# Patient Record
Sex: MALE | Race: WHITE | NOT HISPANIC OR LATINO | Employment: UNEMPLOYED | URBAN - METROPOLITAN AREA
[De-identification: names, ages, dates, MRNs, and addresses within clinical notes are randomized per-mention and may not be internally consistent; named-entity substitution may affect disease eponyms.]

---

## 2017-09-05 ENCOUNTER — TRANSFERRED RECORDS (OUTPATIENT)
Dept: HEALTH INFORMATION MANAGEMENT | Facility: CLINIC | Age: 42
End: 2017-09-05

## 2017-11-30 ENCOUNTER — TRANSFERRED RECORDS (OUTPATIENT)
Dept: HEALTH INFORMATION MANAGEMENT | Facility: CLINIC | Age: 42
End: 2017-11-30

## 2017-12-01 ENCOUNTER — MEDICAL CORRESPONDENCE (OUTPATIENT)
Dept: HEALTH INFORMATION MANAGEMENT | Facility: CLINIC | Age: 42
End: 2017-12-01

## 2018-01-13 ENCOUNTER — HOSPITAL ENCOUNTER (EMERGENCY)
Facility: CLINIC | Age: 43
Discharge: HOME | End: 2018-01-14
Attending: EMERGENCY MEDICINE | Admitting: EMERGENCY MEDICINE
Payer: COMMERCIAL

## 2018-01-13 DIAGNOSIS — F10.920 ALCOHOLIC INTOXICATION WITHOUT COMPLICATION (H): ICD-10-CM

## 2018-01-13 PROCEDURE — 99283 EMERGENCY DEPT VISIT LOW MDM: CPT

## 2018-01-13 ASSESSMENT — ENCOUNTER SYMPTOMS: AGITATION: 1

## 2018-01-13 NOTE — ED AVS SNAPSHOT
Emergency Department    [address] [facility] [address]    Phone:  [phone]    Fax:  [phone]                                       Zak Weeks   MRN: [id number]    Department:   Emergency Department   Date of Visit:  1/13/2018           Patient Information     Date Of Birth          1975        Your diagnoses for this visit were:     Alcoholic intoxication without complication (H)        You were seen by Trierweiler, Chad A, MD.      Follow-up Information     Follow up with  Emergency Department.    Specialty:  EMERGENCY MEDICINE    Why:  If symptoms worsen    Contact information:    [address] [facility] [id number] [phone]        Discharge Instructions         Alcohol Intoxication  Alcohol intoxication occurs when you drink alcohol faster than your liver can remove it from your system. The following facts are important to remember:    It can take 10 minutes or more to start to feel the effects of a drink, so you can easily get more intoxicated than you intended.    One drink may be more than 1 serving of alcohol. Depending on the drink, it can be 2 to 4 servings.    It takes about an hour for your body to metabolize (clear) 1 serving. If you have more than 1 drink, it can take a couple of hours or more.    Many things affect how drinks will affect you, including whether you ve eaten, how fast you drink, your size, how much you normally drink (or not), medicines you take, chronic diseases you have, and gender.  Signs and symptoms of alcohol poisoning  The following are signs and symptoms of alcohol poisoning:  Mild impairment    Reduced inhibitions    Slurred speech    Drowsiness    Decreased fine motor skills  Moderate impairment    Erratic behavior, aggression, depression    Impaired judgment    Confusion    Concentration difficulties    Coordination problems  Severe impairment    Vomiting    Seizures    Unconsciousness    Cold, clammy    Slow or  "irregular breathing    Hypothermia (low body temperature)    Coma  Health effects  Alcohol abuse causes health problems. Sometimes this can happen after only drinking a  little.\" There is no set number of drinks or amount of alcohol that defines too much. The more you drink at one time, and the more frequently you drink determine both the short-term and long-term health effects. It affects all parts of your body and your health, including your:    Brain. Alcohol is a central nervous system depressant. It can damage parts of the brain that affect your balance, memory, thinking, and emotions. It can cause memory loss, blackouts, depression, agitation, sleep cycle changes, and seizures. These changes may or may not be reversible.    Heart and vascular system. Alcohol affects multiple areas. It can damage heart muscle causing cardiomyopathy, which is a weakening and stretching of the heart muscle. This can lead to trouble breathing, an irregular heartbeat, atrial fibrillation, leg swelling, and heart failure. It makes the blood vessels stiffen causing hypertension (high blood pressure). All of these problems increase your risk of having heart attacks or strokes.    Liver. Alcohol causes fat to build up in the liver, affecting its normal function. This increases the risk for hepatitis, leading to abdominal pain, appetite loss, jaundice, bleeding problems, liver fibrosis, and cirrhosis. This in turn can affect your ability to fight off infections, and can cause diabetes. The liver changes prevent it from removing toxins in your blood that can cause encephalopathy. Signs of this are confusion, altered level of consciousness, personality changes, memory loss, seizures, coma, and death.    Pancreas. Alcohol can cause inflammation of the pancreas, or pancreatitis. This can cause pain in your abdomen, fever, and diabetes.    Immune system. Alcohol weakens your immune system in a number of ways. It suppresses your immune system " making it harder to fight off infections and colds. You will also have a higher risk of certain infections like pneumonia and tuberculosis.    Cancer risk. Alcohol raises your risk of cancer of the mouth, esophagus, pharynx, larynx, liver, and breast.    Sexual function. Alcohol abuse can also lead to sexual problems.  Alcohol use during pregnancy may cause permanent damage to the growing baby.  Home care  The following guidelines will help you care for yourself at home:    Don't drink any more alcohol.    Don't drive until all effects of the alcohol have worn off.    Don't operate machinery that can cause injuries.    Get lots of rest over the next few days. Drink plenty of water and other non-alcoholic liquids. Try to eat regular meals.    If you have been drinking heavily on a daily basis, you may go through alcohol withdrawal. The usual symptoms last 3 to 4 days and may include nervousness, shakiness, nausea, sweating, sleeplessness, and can even cause seizures and a serious withdrawal symptom called delirium tremens, or DTs. During this time, it is best that you stay with family or friends who can help and support you. You can also admit yourself to a residential detox program. If your symptoms are severe (seizures, severe shakiness, confusion), contact your doctor or call an ambulance for help (see below).   Follow-up care  If alcohol is a problem in your life, these are some organizations that can help you:    Alcoholics Anonymous offers support through a self-help fellowship. There are no dues or fees. See the Yellow Pages and call for time and place of meetings. Find AA online at www.aa.org.    [employer] offers support to families of alcohol users. Contact 786-964-4272, or online at www.al-rohini.org.    National [place] on Alcoholism and Drug Dependence can be reached at 363-844-4173, or online at www.ncadd.org.    There are also inpatient and residential alcohol detox programs. Check the Internet or phonebook  Yellow Pages under  Drug Abuse and Treatment Centers.   Call 911  Call 911 if any of these occur:    Trouble breathing or slow irregular breathing    Chest pain    Sudden weakness on one side of your body or sudden trouble speaking    Heavy bleeding or vomiting blood    Very drowsy or trouble awakening    Fainting or loss of consciousness    Rapid heart rate    Seizure  When to seek medical advice  Call your healthcare provider right away if any of these occur:    Severe shakiness     Fever over 100.4  F (38.0  C)    Confusion or hallucinations (seeing, hearing, or feeling things that are not there)    Pain in your upper abdomen that gets worse    Repeated vomiting  Date Last Reviewed: 6/1/2016 2000-2017 [employer]. [address] [place] [address], [place] [address]. All rights reserved. This information is not intended as a substitute for professional medical care. Always follow your healthcare professional's instructions.          24 Hour Appointment Hotline       To make an appointment at any [facility], call [phone] ([phone]). If you don't have a family doctor or clinic, we will help you find one. [facility] clinics are conveniently located to serve the needs of you and your family.             Review of your medicines      Our records show that you are taking the medicines listed below. If these are incorrect, please call your family doctor or clinic.        Dose / Directions Last dose taken    buPROPion 150 MG 12 hr tablet   Commonly known as:  WELLBUTRIN SR   Quantity:  60 tablet        Take 1 tablet a day for 5 days then increase to 1 tablet twice a day   Refills:  1        omeprazole 20 MG CR capsule   Commonly known as:  priLOSEC   Dose:  20 mg   Quantity:  30 capsule        Take 1 capsule (20 mg) by mouth daily   Refills:  5                Orders Needing Specimen Collection     None      Pending Results     No orders found for last 3 day(s).            Pending Culture Results      No orders found for last 3 day(s).            Pending Results Instructions     If you had any lab results that were not finalized at the time of your Discharge, you can call the ED Lab Result RN at [phone]. You will be contacted by this team for any positive Lab results or changes in treatment. The nurses are available 7 days a week from 10A to 6:30P.  You can leave a message 24 hours per day and they will return your call.        Test Results From Your Hospital Stay               Clinical Quality Measure: Blood Pressure Screening     Your blood pressure was checked while you were in the emergency department today. The last reading we obtained was  BP: 94/55 . Please read the guidelines below about what these numbers mean and what you should do about them.  If your systolic blood pressure (the top number) is less than 120 and your diastolic blood pressure (the bottom number) is less than 80, then your blood pressure is normal. There is nothing more that you need to do about it.  If your systolic blood pressure (the top number) is 120-139 or your diastolic blood pressure (the bottom number) is 80-89, your blood pressure may be higher than it should be. You should have your blood pressure rechecked within a year by a primary care provider.  If your systolic blood pressure (the top number) is 140 or greater or your diastolic blood pressure (the bottom number) is 90 or greater, you may have high blood pressure. High blood pressure is treatable, but if left untreated over time it can put you at risk for heart attack, stroke, or kidney failure. You should have your blood pressure rechecked by a primary care provider within the next 4 weeks.  If your provider in the emergency department today gave you specific instructions to follow-up with your doctor or provider even sooner than that, you should follow that instruction and not wait for up to 4 weeks for your follow-up visit.        Thank you for choosing [facility]   "     Thank you for choosing [facility] for your care. Our goal is always to provide you with excellent care. Hearing back from our patients is one way we can continue to improve our services. Please take a few minutes to complete the written survey that you may receive in the mail after you visit with us. Thank you!        [employer] Information     [employer] lets you send messages to your doctor, view your test results, renew your prescriptions, schedule appointments and more. To sign up, go to [email or web address] . Click on \"Log in\" on the left side of the screen, which will take you to the Welcome page. Then click on \"Sign up Now\" on the right side of the page.     You will be asked to enter the access code listed below, as well as some personal information. Please follow the directions to create your username and password.     Your access code is: [email or web address]  Expires: 2018  4:22 AM     Your access code will  in 90 days. If you need help or a new code, please call your [facility] clinic or [phone].        Care EveryWhere ID     This is your Care EveryWhere ID. This could be used by other organizations to access your [facility] medical records  [id number]        Equal Access to Services     SILVANO RODRIGUES : Sandee Cortez, wachristi ortez, qadickson kaalmasophia sanchez, lawrence ruiz. So [facility] [phone].    ATENCIÓN: Si habla español, tiene a beasley disposición servicios gratuitos de asistencia lingüística. Llame al [phone].    We comply with applicable federal civil rights laws and [place] laws. We do not discriminate on the basis of race, color, national origin, age, disability, sex, sexual orientation, or gender identity.            After Visit Summary       This is your record. Keep this with you and show to your community pharmacist(s) and doctor(s) at your next visit.                  "

## 2018-01-13 NOTE — ED AVS SNAPSHOT
Emergency Department    [address] [place] [address]    Phone:  [phone]    Fax:  [phone]                                       Zak Weeks   MRN: [id number]    Department:   Emergency Department   Date of Visit:  1/13/2018           After Visit Summary Signature Page     I have received my discharge instructions, and my questions have been answered. I have discussed any challenges I see with this plan with the nurse or doctor.    ..........................................................................................................................................  Patient/Patient Representative Signature      ..........................................................................................................................................  Patient Representative Print Name and Relationship to Patient    ..................................................               ................................................  Date                                            Time    ..........................................................................................................................................  Reviewed by Signature/Title    ...................................................              ..............................................  Date                                                            Time

## 2018-01-14 VITALS
OXYGEN SATURATION: 94 % | DIASTOLIC BLOOD PRESSURE: 96 MMHG | SYSTOLIC BLOOD PRESSURE: 130 MMHG | RESPIRATION RATE: 16 BRPM | TEMPERATURE: 97.4 F

## 2018-01-14 NOTE — ED PROVIDER NOTES
History     Chief Complaint:  Alcohol intoxication     HPI:   The history is provided by the EMS personnel. The history is limited by the condition of the patient.      Zak Weeks is a 42 year old male who presents via EMS with alcohol intoxication. Per EMS, the patient had been gone several hours from his group home at the [facility] and returned smelling of alcohol. He was not responsive to questions at the house prompting staff to activate EMS. Here in th ED his breathalyzer is 0.28, and he has continued to have a decreased level of consciousness. On evaluation he only responded with vulgar language.     Allergies:  Bee Venom     Medications:    Wellbutrin   Prilosec     Past Medical History:    ADHD  Depression   Methamphetamine use   Seizure   Schizoaffective disorder   GERD  Traumatic brain injury     Past Surgical History:    Surgical pathology   Clot removed form brain     Family History:    Cancer   Depression   Bipolar disorder   Substance abuse     Social History:  Presents with police    Tobacco use: Current every day smoker   Alcohol use: Once a week   PCP: Lucille Sapp    Marital Status:  Single     Review of Systems   Psychiatric/Behavioral: Positive for agitation.   ROS LIMITED SECONDARY TO ALTERED MENTAL STATUS    Physical Exam     Patient Vitals for the past 24 hrs:   BP Temp Temp src Resp SpO2   01/13/18 2320 99/69 - - - 94 %   01/13/18 2315 - - - - 98 %   01/13/18 2245 - - - - 96 %   01/13/18 2230 - - - - 93 %   01/13/18 2215 - - - - 95 %   01/13/18 2007 (!) 129/102 97.4  F (36.3  C) Tympanic 16 97 %        Physical Exam  Eye:  Pupils are equal, round, and reactive.  Extraocular movements intact.    ENT:  No rhinorrhea.  Moist mucus membranes.  Normal tongue and tonsil.    Cardiac:  Regular rate and rhythm.  No murmurs, gallops, or rubs.    Pulmonary:  Clear to auscultation bilaterally.  No wheezes, rales, or rhonchi.    Abdomen:  Positive bowel sounds.  Abdomen is soft and  non-distended, without focal tenderness.    Musculoskeletal:  Normal movement of all extremities without evidence for deficit.    Skin:  Warm and dry without rashes.    Neurologic:  Non-focal exam without asymmetric weakness or numbness.     Psychiatric:  Patient is markedly intoxicated though arouses to stimuli, verbalizing expletives.     Emergency Department Course     Emergency Department Course:  Past medical records, nursing notes, and vitals reviewed.  2229: I performed an exam of the patient and obtained history, as documented above.   0000:  Patient improving, but still unable to ambulate without assistance.    Impression & Plan      Medical Decision Making:  This 42-year-old man with a history of traumatic brain injury and polysubstance abuse presents to us by EMS for altered mental status.  The patient left his group home for a while today and was able to get his hands on alcohol.  He returned to the group home and a significantly intoxicated state and was unable to care for himself.  Therefore he was sent here for further assessment.  On my exam, the patient smells very strongly of alcohol and has an alcohol level of greater than 0.2.  He was observed during my shift over a period of 3 hours during which time he did sober appropriately.  However, at the end of my shift, he continues to be unable to ambulate or be discharged to his group home.  Therefore he will be signed out to the oncoming emergency physician for continued sober reevaluation with probable discharge in the morning.  From what my nurse has told me, the group home is willing to take him back when he is able to care for himself and he should be discharged at that point.  With a strong smell of alcohol and his clearance during my shift, I do not feel he requires a head CT, lab work, or other intervention at this juncture.    Diagnosis:    ICD-10-CM    1. Alcoholic intoxication without complication (H) F10.920        Disposition:  Boarding in  ER until sober enough to ambulate and return to group home    Scribe Disclosure:   I, Mark Barton, am serving as a scribe at 8:29 PM on 1/13/2018 to document services personally performed by Trierweiler, Chad A, MD based on my observations and the provider's statements to me.       Mark Barton  1/13/2018    EMERGENCY DEPARTMENT       Trierweiler, Chad A, MD  01/14/18 0039

## 2018-01-14 NOTE — DISCHARGE INSTRUCTIONS
"  Alcohol Intoxication  Alcohol intoxication occurs when you drink alcohol faster than your liver can remove it from your system. The following facts are important to remember:    It can take 10 minutes or more to start to feel the effects of a drink, so you can easily get more intoxicated than you intended.    One drink may be more than 1 serving of alcohol. Depending on the drink, it can be 2 to 4 servings.    It takes about an hour for your body to metabolize (clear) 1 serving. If you have more than 1 drink, it can take a couple of hours or more.    Many things affect how drinks will affect you, including whether you ve eaten, how fast you drink, your size, how much you normally drink (or not), medicines you take, chronic diseases you have, and gender.  Signs and symptoms of alcohol poisoning  The following are signs and symptoms of alcohol poisoning:  Mild impairment    Reduced inhibitions    Slurred speech    Drowsiness    Decreased fine motor skills  Moderate impairment    Erratic behavior, aggression, depression    Impaired judgment    Confusion    Concentration difficulties    Coordination problems  Severe impairment    Vomiting    Seizures    Unconsciousness    Cold, clammy    Slow or irregular breathing    Hypothermia (low body temperature)    Coma  Health effects  Alcohol abuse causes health problems. Sometimes this can happen after only drinking a  little.\" There is no set number of drinks or amount of alcohol that defines too much. The more you drink at one time, and the more frequently you drink determine both the short-term and long-term health effects. It affects all parts of your body and your health, including your:    Brain. Alcohol is a central nervous system depressant. It can damage parts of the brain that affect your balance, memory, thinking, and emotions. It can cause memory loss, blackouts, depression, agitation, sleep cycle changes, and seizures. These changes may or may not be " reversible.    Heart and vascular system. Alcohol affects multiple areas. It can damage heart muscle causing cardiomyopathy, which is a weakening and stretching of the heart muscle. This can lead to trouble breathing, an irregular heartbeat, atrial fibrillation, leg swelling, and heart failure. It makes the blood vessels stiffen causing hypertension (high blood pressure). All of these problems increase your risk of having heart attacks or strokes.    Liver. Alcohol causes fat to build up in the liver, affecting its normal function. This increases the risk for hepatitis, leading to abdominal pain, appetite loss, jaundice, bleeding problems, liver fibrosis, and cirrhosis. This in turn can affect your ability to fight off infections, and can cause diabetes. The liver changes prevent it from removing toxins in your blood that can cause encephalopathy. Signs of this are confusion, altered level of consciousness, personality changes, memory loss, seizures, coma, and death.    Pancreas. Alcohol can cause inflammation of the pancreas, or pancreatitis. This can cause pain in your abdomen, fever, and diabetes.    Immune system. Alcohol weakens your immune system in a number of ways. It suppresses your immune system making it harder to fight off infections and colds. You will also have a higher risk of certain infections like pneumonia and tuberculosis.    Cancer risk. Alcohol raises your risk of cancer of the mouth, esophagus, pharynx, larynx, liver, and breast.    Sexual function. Alcohol abuse can also lead to sexual problems.  Alcohol use during pregnancy may cause permanent damage to the growing baby.  Home care  The following guidelines will help you care for yourself at home:    Don't drink any more alcohol.    Don't drive until all effects of the alcohol have worn off.    Don't operate machinery that can cause injuries.    Get lots of rest over the next few days. Drink plenty of water and other non-alcoholic liquids.  Try to eat regular meals.    If you have been drinking heavily on a daily basis, you may go through alcohol withdrawal. The usual symptoms last 3 to 4 days and may include nervousness, shakiness, nausea, sweating, sleeplessness, and can even cause seizures and a serious withdrawal symptom called delirium tremens, or DTs. During this time, it is best that you stay with family or friends who can help and support you. You can also admit yourself to a residential detox program. If your symptoms are severe (seizures, severe shakiness, confusion), contact your doctor or call an ambulance for help (see below).   Follow-up care  If alcohol is a problem in your life, these are some organizations that can help you:    Alcoholics Anonymous offers support through a self-help fellowship. There are no dues or fees. See the Yellow Pages and call for time and place of meetings. Find AA online at www.aa.org.    [employer] offers support to families of alcohol users. Contact 752-842-4382, or online at www.al-anoivan.org.    National [place] on Alcoholism and Drug Dependence can be reached at 710-660-8608, or online at www.ncadd.org.    There are also inpatient and residential alcohol detox programs. Check the Internet or phonebook Yellow Pages under  Drug Abuse and Treatment Centers.   Call 911  Call 911 if any of these occur:    Trouble breathing or slow irregular breathing    Chest pain    Sudden weakness on one side of your body or sudden trouble speaking    Heavy bleeding or vomiting blood    Very drowsy or trouble awakening    Fainting or loss of consciousness    Rapid heart rate    Seizure  When to seek medical advice  Call your healthcare provider right away if any of these occur:    Severe shakiness     Fever over 100.4  F (38.0  C)    Confusion or hallucinations (seeing, hearing, or feeling things that are not there)    Pain in your upper abdomen that gets worse    Repeated vomiting  Date Last Reviewed: 6/1/2016 2000-2017 [employer]. [address] [place] [address], [place] [address]. All rights reserved. This information is not intended as a substitute for professional medical care. Always follow your healthcare professional's instructions.

## 2018-02-23 ENCOUNTER — OFFICE VISIT (OUTPATIENT)
Dept: INTERNAL MEDICINE | Facility: CLINIC | Age: 43
End: 2018-02-23
Payer: COMMERCIAL

## 2018-02-23 VITALS
HEIGHT: 70 IN | TEMPERATURE: 98.2 F | WEIGHT: 212.7 LBS | RESPIRATION RATE: 24 BRPM | SYSTOLIC BLOOD PRESSURE: 106 MMHG | DIASTOLIC BLOOD PRESSURE: 72 MMHG | BODY MASS INDEX: 30.45 KG/M2 | HEART RATE: 82 BPM

## 2018-02-23 DIAGNOSIS — K21.9 GASTROESOPHAGEAL REFLUX DISEASE, ESOPHAGITIS PRESENCE NOT SPECIFIED: ICD-10-CM

## 2018-02-23 DIAGNOSIS — F25.9 SCHIZOAFFECTIVE DISORDER, CHRONIC CONDITION (H): Primary | ICD-10-CM

## 2018-02-23 DIAGNOSIS — S06.9X9D TRAUMATIC BRAIN INJURY WITH LOSS OF CONSCIOUSNESS, SUBSEQUENT ENCOUNTER: ICD-10-CM

## 2018-02-23 DIAGNOSIS — F33.1 MODERATE RECURRENT MAJOR DEPRESSION (H): ICD-10-CM

## 2018-02-23 PROCEDURE — 99214 OFFICE O/P EST MOD 30 MIN: CPT | Performed by: INTERNAL MEDICINE

## 2018-02-23 RX ORDER — RISPERIDONE 2 MG/1
2 TABLET, ORALLY DISINTEGRATING ORAL 2 TIMES DAILY PRN
COMMUNITY
Start: 2018-01-02

## 2018-02-23 RX ORDER — HYDROXYZINE PAMOATE 50 MG/1
50 CAPSULE ORAL 3 TIMES DAILY PRN
COMMUNITY
Start: 2018-01-02 | End: 2019-05-28

## 2018-02-23 RX ORDER — BENZTROPINE MESYLATE 1 MG/1
1 TABLET ORAL 3 TIMES DAILY PRN
COMMUNITY
Start: 2018-02-09 | End: 2021-09-20

## 2018-02-23 RX ORDER — QUETIAPINE FUMARATE 50 MG/1
25 TABLET, FILM COATED ORAL DAILY
COMMUNITY
Start: 2018-01-02 | End: 2021-09-20

## 2018-02-23 RX ORDER — CLONAZEPAM 0.5 MG/1
0.5 TABLET ORAL 2 TIMES DAILY PRN
COMMUNITY
Start: 2018-03-05 | End: 2019-08-30

## 2018-02-23 RX ORDER — DIVALPROEX SODIUM 250 MG/1
750 TABLET, DELAYED RELEASE ORAL 3 TIMES DAILY
COMMUNITY
Start: 2018-01-02 | End: 2021-09-20

## 2018-02-23 ASSESSMENT — PAIN SCALES - GENERAL: PAINLEVEL: WORST PAIN (10)

## 2018-02-23 NOTE — MR AVS SNAPSHOT
After Visit Summary   2/23/2018    Zak Weeks    MRN: [id number]           Patient Information     Date Of Birth          1975        Visit Information        Provider Department      2/23/2018 10:00 AM Lloyd Calixto MD [facility]        Today's Diagnoses     Schizoaffective disorder, chronic condition (H)    -  1    Moderate recurrent major depression (H)        Traumatic brain injury with loss of consciousness, subsequent encounter        Gastroesophageal reflux disease, esophagitis presence not specified          Care Instructions    Continue all medications at the same doses.  Contact your usual pharmacy if you need refills.     Discussed any psychaitric medication changes with your psychiatrist.           Follow-ups after your visit        Additional Services     MENTAL HEALTH REFERRAL  - Adult; Psychiatry and Medication Management; Psychiatry; Other: Not Listed - Enter Referral Details in Scheduling Comments Below; Patient call to schedule       All scheduling is subject to the client's specific insurance plan & benefits, provider/location availability, and provider clinical specialities.  Please arrive 15 minutes early for your first appointment and bring your completed paperwork.    **needs full time psychiatrist to manage his issues (NOT the consultative/collaborative psychiatry referral).  He is moving to the [facility] area very soon and would like provider in [facility]    ** Schedule appointment through the group home staff at the [facility] at [phone]    Please be aware that coverage of these services is subject to the terms and limitations of your health insurance plan.  Call member services at your health plan with any benefit or coverage questions.                  Who to contact     If you have questions or need follow up information about today's clinic visit or your schedule please contact [facility]  "directly at [phone].  Normal or non-critical lab and imaging results will be communicated to you by APProtecthart, letter or phone within 4 business days after the clinic has received the results. If you do not hear from us within 7 days, please contact the clinic through APProtecthart or phone. If you have a critical or abnormal lab result, we will notify you by phone as soon as possible.  Submit refill requests through Pyrolia or call your pharmacy and they will forward the refill request to us. Please allow 3 business days for your refill to be completed.          Additional Information About Your Visit        APProtectharNotch Wearable Movement Capture Information     Pyrolia lets you send messages to your doctor, view your test results, renew your prescriptions, schedule appointments and more. To sign up, go to [email or web address] . Click on \"Log in\" on the left side of the screen, which will take you to the Welcome page. Then click on \"Sign up Now\" on the right side of the page.     You will be asked to enter the access code listed below, as well as some personal information. Please follow the directions to create your username and password.     Your access code is: [email or web address]  Expires: 2018  4:22 AM     Your access code will  in 90 days. If you need help or a new code, please call your [place] clinic or [phone].        Care EveryWhere ID     This is your Care EveryWhere ID. This could be used by other organizations to access your [place] medical records  [id number]        Your Vitals Were     Pulse Temperature Respirations Height BMI (Body Mass Index)       82 98.2  F (36.8  C) (Oral) 24 5' 9.5\" (1.765 m) 30.96 kg/m2        Blood Pressure from Last 3 Encounters:   18 106/72   18 (!) 130/96   14 98/64    Weight from Last 3 Encounters:   18 212 lb 11.2 oz (96.5 kg)   14 178 lb (80.7 kg)   14 177 lb 12.8 oz (80.6 kg)              We Performed the Following     MENTAL HEALTH REFERRAL  - Adult; " Psychiatry and Medication Management; Psychiatry; Other: Not Listed - Enter Referral Details in Scheduling Comments Below; Patient call to schedule        Primary Care Provider Office Phone # Fax #    LISET Watson [phone]       XXX RESIGNED  [facility] [address]        Equal Access to Services     SILVANO RODRIGUES : Hadii aad ku hadasho Soomaali, waaxda luqadaha, qaybta kaalmada adeegyada, waxay idiin hayaan ademorena khmilagroskatherin lacristophern . So [facility] [phone].    ATENCIÓN: Si habla español, tiene a beasley disposición servicios gratuitos de asistencia lingüística. Anastasia al [phone].    We comply with applicable federal civil rights laws and [place] laws. We do not discriminate on the basis of race, color, national origin, age, disability, sex, sexual orientation, or gender identity.            Thank you!     Thank you for choosing [facility]  for your care. Our goal is always to provide you with excellent care. Hearing back from our patients is one way we can continue to improve our services. Please take a few minutes to complete the written survey that you may receive in the mail after your visit with us. Thank you!             Your Updated Medication List - Protect others around you: Learn how to safely use, store and throw away your medicines at www.disposemymeds.org.          This list is accurate as of 2/23/18 11:59 PM.  Always use your most recent med list.                   Brand Name Dispense Instructions for use Diagnosis    benztropine 1 MG tablet    COGENTIN     1 mg 3 times daily as needed    Schizoaffective disorder, chronic condition (H), Moderate recurrent major depression (H)       buPROPion 150 MG 12 hr tablet    WELLBUTRIN SR    60 tablet    Take 1 tablet a day for 5 days then increase to 1 tablet twice a day    Tobacco abuse       clonazePAM 0.5 MG tablet   Start taking on:  3/5/2018    klonoPIN     0.5 mg 2 times daily as  needed    Schizoaffective disorder, chronic condition (H), Moderate recurrent major depression (H)       divalproex sodium delayed-release 250 MG DR tablet    DEPAKOTE     Take 750 mg by mouth 3 times daily    Schizoaffective disorder, chronic condition (H), Moderate recurrent major depression (H)       hydrOXYzine 50 MG capsule    VISTARIL     50 mg 3 times daily as needed    Schizoaffective disorder, chronic condition (H), Moderate recurrent major depression (H)       omeprazole 20 MG CR capsule    priLOSEC    30 capsule    Take 1 capsule (20 mg) by mouth daily    Esophageal reflux       paliperidone 234 MG/1.5ML Susp    INVEGA SUSTENNA     Inject 234 mg into the muscle every 28 days    Schizoaffective disorder, chronic condition (H), Moderate recurrent major depression (H)       QUEtiapine 50 MG tablet    SEROquel     Take 25 mg by mouth daily    Schizoaffective disorder, chronic condition (H), Moderate recurrent major depression (H)       risperiDONE 2 MG ODT tab    risperDAL M-TABS     Place 2 mg under the tongue 2 times daily as needed    Schizoaffective disorder, chronic condition (H), Moderate recurrent major depression (H)
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
## 2018-03-02 ENCOUNTER — TELEPHONE (OUTPATIENT)
Dept: INTERNAL MEDICINE | Facility: CLINIC | Age: 43
End: 2018-03-02

## 2018-03-05 NOTE — PATIENT INSTRUCTIONS
Continue all medications at the same doses.  Contact your usual pharmacy if you need refills.     Discussed any psychaitric medication changes with your psychiatrist.

## 2018-03-28 ENCOUNTER — OFFICE VISIT (OUTPATIENT)
Dept: INTERNAL MEDICINE | Facility: CLINIC | Age: 43
End: 2018-03-28
Payer: COMMERCIAL

## 2018-03-28 VITALS
SYSTOLIC BLOOD PRESSURE: 116 MMHG | TEMPERATURE: 98.3 F | OXYGEN SATURATION: 97 % | DIASTOLIC BLOOD PRESSURE: 82 MMHG | BODY MASS INDEX: 30.25 KG/M2 | RESPIRATION RATE: 16 BRPM | HEART RATE: 72 BPM | WEIGHT: 207.8 LBS

## 2018-03-28 DIAGNOSIS — Z00.00 NORMAL PHYSICAL EXAMINATION: Primary | ICD-10-CM

## 2018-03-28 PROCEDURE — 99396 PREV VISIT EST AGE 40-64: CPT | Performed by: PHYSICIAN ASSISTANT

## 2018-03-28 ASSESSMENT — PAIN SCALES - GENERAL: PAINLEVEL: WORST PAIN (10)

## 2018-03-28 NOTE — MR AVS SNAPSHOT
"              After Visit Summary   3/28/2018    Zak Weeks    MRN: [id number]           Patient Information     Date Of Birth          1975        Visit Information        Provider Department      3/28/2018 8:40 AM Radha Coelho PA-C [facility]        Today's Diagnoses     Normal physical examination    -  1       Follow-ups after your visit        Who to contact     If you have questions or need follow up information about today's clinic visit or your schedule please contact [facility] directly at [phone].  Normal or non-critical lab and imaging results will be communicated to you by [employer] Diagnosticshart, letter or phone within 4 business days after the clinic has received the results. If you do not hear from us within 7 days, please contact the clinic through [employer] Diagnosticshart or phone. If you have a critical or abnormal lab result, we will notify you by phone as soon as possible.  Submit refill requests through LitRes or call your pharmacy and they will forward the refill request to us. Please allow 3 business days for your refill to be completed.          Additional Information About Your Visit        MyChart Information     LitRes lets you send messages to your doctor, view your test results, renew your prescriptions, schedule appointments and more. To sign up, go to [email or web address] . Click on \"Log in\" on the left side of the screen, which will take you to the Welcome page. Then click on \"Sign up Now\" on the right side of the page.     You will be asked to enter the access code listed below, as well as some personal information. Please follow the directions to create your username and password.     Your access code is: [email or web address]  Expires: 2018  5:22 AM     Your access code will  in 90 days. If you need help or a new code, please call your [facility] or [phone].        Care EveryWhere ID     This is your Care EveryWhere ID. This could " be used by other organizations to access your [place] medical records  [id number]        Your Vitals Were     Pulse Temperature Respirations Pulse Oximetry BMI (Body Mass Index)       72 98.3  F (36.8  C) (Oral) 16 97% 30.25 kg/m2        Blood Pressure from Last 3 Encounters:   03/28/18 116/82   02/23/18 106/72   01/14/18 (!) 130/96    Weight from Last 3 Encounters:   03/28/18 207 lb 12.8 oz (94.3 kg)   02/23/18 212 lb 11.2 oz (96.5 kg)   09/30/14 178 lb (80.7 kg)              Today, you had the following     No orders found for display       Primary Care Provider Office Phone # Fax #    Lucille BossLISET landon [phone]       XXX RESIGNED  [facility] [id number]        Equal Access to Services     [facility]: Hadii aad ku hadasho Sonilsaali, waaxda luqadaha, qaybta kaalmada adeegyada, waxay idiin hayivelissen raquel rivera . So [facility] [phone].    ATENCIÓN: Si habla español, tiene a beasley disposición servicios gratuitos de asistencia lingüística. Llame al [phone].    We comply with applicable federal civil rights laws and [place] laws. We do not discriminate on the basis of race, color, national origin, age, disability, sex, sexual orientation, or gender identity.            Thank you!     Thank you for choosing [facility]  for your care. Our goal is always to provide you with excellent care. Hearing back from our patients is one way we can continue to improve our services. Please take a few minutes to complete the written survey that you may receive in the mail after your visit with us. Thank you!             Your Updated Medication List - Protect others around you: Learn how to safely use, store and throw away your medicines at www.disposemymeds.org.          This list is accurate as of 3/28/18 12:29 PM.  Always use your most recent med list.                   Brand Name Dispense Instructions for use Diagnosis    benztropine 1 MG  tablet    COGENTIN     1 mg 3 times daily as needed    Schizoaffective disorder, chronic condition (H), Moderate recurrent major depression (H)       buPROPion 150 MG 12 hr tablet    WELLBUTRIN SR    60 tablet    Take 1 tablet a day for 5 days then increase to 1 tablet twice a day    Tobacco abuse       clonazePAM 0.5 MG tablet    klonoPIN     0.5 mg 2 times daily as needed    Schizoaffective disorder, chronic condition (H), Moderate recurrent major depression (H)       divalproex sodium delayed-release 250 MG DR tablet    DEPAKOTE     Take 750 mg by mouth 3 times daily    Schizoaffective disorder, chronic condition (H), Moderate recurrent major depression (H)       hydrOXYzine 50 MG capsule    VISTARIL     50 mg 3 times daily as needed    Schizoaffective disorder, chronic condition (H), Moderate recurrent major depression (H)       omeprazole 20 MG CR capsule    priLOSEC    30 capsule    Take 1 capsule (20 mg) by mouth daily    Esophageal reflux       paliperidone 234 MG/1.5ML Susp    INVEGA SUSTENNA     Inject 234 mg into the muscle every 28 days    Schizoaffective disorder, chronic condition (H), Moderate recurrent major depression (H)       QUEtiapine 50 MG tablet    SEROquel     Take 25 mg by mouth daily    Schizoaffective disorder, chronic condition (H), Moderate recurrent major depression (H)       risperiDONE 2 MG ODT tab    risperDAL M-TABS     Place 2 mg under the tongue 2 times daily as needed    Schizoaffective disorder, chronic condition (H), Moderate recurrent major depression (H)

## 2018-03-28 NOTE — PROGRESS NOTES
SUBJECTIVE:   CC: Zak Weeks is an 43 year old male who presents for a physical exam for his group home.     - pt presents to clinic for a physical exam for group home facility   - he declines wanting annual physical   - he has no concerns otherwise   - follows with psychiatry  - medication mangement through psychology   - h/o of acid reflux well controlled on medication, no symptoms     Reviewed and updated as needed this visit by clinical staff  Tobacco  Allergies  Meds  Problems         Reviewed and updated as needed this visit by Provider  Meds  Problems          ROS:  C: NEGATIVE for fever, chills, change in weight  I: NEGATIVE for worrisome rashes, moles or lesions  E: NEGATIVE for vision changes or irritation  ENT: NEGATIVE for ear, mouth and throat problems  R: NEGATIVE for significant cough or SOB  CV: NEGATIVE for chest pain, palpitations or peripheral edema  GI: NEGATIVE for nausea, abdominal pain, or change in bowel habits  GI: POSITIVE for heartburn or reflux   male: negative for dysuria, hematuria, decreased urinary stream, erectile dysfunction, urethral discharge  M: NEGATIVE for significant arthralgias or myalgia  N: NEGATIVE for weakness, dizziness or paresthesias    OBJECTIVE:   /82  Pulse 72  Temp 98.3  F (36.8  C) (Oral)  Resp 16  Wt 207 lb 12.8 oz (94.3 kg)  SpO2 97%  BMI 30.25 kg/m2  EXAM:  GENERAL: healthy, alert and no distress  EYES: Eyes grossly normal to inspection, PERRL and conjunctivae and sclerae normal  HENT: ear canals and TM's normal, nose and mouth without ulcers or lesions  NECK: no adenopathy, no asymmetry, masses, or scars and thyroid normal to palpation  RESP: lungs clear to auscultation - no rales, rhonchi or wheezes  CV: regular rate and rhythm, normal S1 S2, no S3 or S4, no murmur, click or rub, no peripheral edema and peripheral pulses strong  ABDOMEN: soft, nontender, no hepatosplenomegaly, no masses and bowel sounds normal  MS: no gross  musculoskeletal defects noted, no edema  SKIN: no suspicious lesions or rashes  NEURO: Normal strength and tone, mentation intact and speech normal  PSYCH: alert and oriented, mood flat, and speech clear    ASSESSMENT/PLAN:     1. Normal physical examination  - normal exam  - reviewed health maintenance, pt declined tetanus vaccination   - paperwork for group home completed   - pt should have annual wellness exams, although he declined this today     Radha Coelho PA-C  [facility]
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
## 2018-10-23 ENCOUNTER — OFFICE VISIT (OUTPATIENT)
Dept: FAMILY MEDICINE | Facility: CLINIC | Age: 43
End: 2018-10-23
Payer: COMMERCIAL

## 2018-10-23 VITALS
HEIGHT: 69 IN | TEMPERATURE: 97.8 F | BODY MASS INDEX: 27.16 KG/M2 | WEIGHT: 183.4 LBS | HEART RATE: 71 BPM | OXYGEN SATURATION: 98 % | RESPIRATION RATE: 20 BRPM | DIASTOLIC BLOOD PRESSURE: 68 MMHG | SYSTOLIC BLOOD PRESSURE: 112 MMHG

## 2018-10-23 DIAGNOSIS — K21.9 GASTROESOPHAGEAL REFLUX DISEASE, ESOPHAGITIS PRESENCE NOT SPECIFIED: Primary | ICD-10-CM

## 2018-10-23 DIAGNOSIS — Z23 NEED FOR PROPHYLACTIC VACCINATION AND INOCULATION AGAINST INFLUENZA: ICD-10-CM

## 2018-10-23 DIAGNOSIS — Z11.3 SCREEN FOR STD (SEXUALLY TRANSMITTED DISEASE): ICD-10-CM

## 2018-10-23 DIAGNOSIS — E78.5 HYPERLIPIDEMIA: ICD-10-CM

## 2018-10-23 DIAGNOSIS — Z13.6 CARDIOVASCULAR SCREENING; LDL GOAL LESS THAN 160: ICD-10-CM

## 2018-10-23 DIAGNOSIS — S06.9X9D TRAUMATIC BRAIN INJURY WITH LOSS OF CONSCIOUSNESS, SUBSEQUENT ENCOUNTER: ICD-10-CM

## 2018-10-23 DIAGNOSIS — Z23 NEED FOR PROPHYLACTIC VACCINATION WITH TETANUS-DIPHTHERIA (TD): ICD-10-CM

## 2018-10-23 LAB
ANION GAP SERPL CALCULATED.3IONS-SCNC: 7 MMOL/L (ref 3–14)
BUN SERPL-MCNC: 12 MG/DL (ref 7–30)
CALCIUM SERPL-MCNC: 9.3 MG/DL (ref 8.5–10.1)
CHLORIDE SERPL-SCNC: 106 MMOL/L (ref 94–109)
CHOLEST SERPL-MCNC: 230 MG/DL
CO2 SERPL-SCNC: 26 MMOL/L (ref 20–32)
CREAT SERPL-MCNC: 1.08 MG/DL (ref 0.66–1.25)
GFR SERPL CREATININE-BSD FRML MDRD: 74 ML/MIN/1.7M2
GLUCOSE SERPL-MCNC: 88 MG/DL (ref 70–99)
HDLC SERPL-MCNC: 32 MG/DL
LDLC SERPL CALC-MCNC: 157 MG/DL
NONHDLC SERPL-MCNC: 198 MG/DL
POTASSIUM SERPL-SCNC: 4.1 MMOL/L (ref 3.4–5.3)
SODIUM SERPL-SCNC: 139 MMOL/L (ref 133–144)
TRIGL SERPL-MCNC: 203 MG/DL

## 2018-10-23 PROCEDURE — 80061 LIPID PANEL: CPT | Performed by: INTERNAL MEDICINE

## 2018-10-23 PROCEDURE — 86803 HEPATITIS C AB TEST: CPT | Performed by: INTERNAL MEDICINE

## 2018-10-23 PROCEDURE — 90686 IIV4 VACC NO PRSV 0.5 ML IM: CPT | Performed by: INTERNAL MEDICINE

## 2018-10-23 PROCEDURE — 90715 TDAP VACCINE 7 YRS/> IM: CPT | Performed by: INTERNAL MEDICINE

## 2018-10-23 PROCEDURE — 80048 BASIC METABOLIC PNL TOTAL CA: CPT | Performed by: INTERNAL MEDICINE

## 2018-10-23 PROCEDURE — 90472 IMMUNIZATION ADMIN EACH ADD: CPT | Performed by: INTERNAL MEDICINE

## 2018-10-23 PROCEDURE — 36415 COLL VENOUS BLD VENIPUNCTURE: CPT | Performed by: INTERNAL MEDICINE

## 2018-10-23 PROCEDURE — 99213 OFFICE O/P EST LOW 20 MIN: CPT | Mod: 25 | Performed by: INTERNAL MEDICINE

## 2018-10-23 PROCEDURE — 87389 HIV-1 AG W/HIV-1&-2 AB AG IA: CPT | Performed by: INTERNAL MEDICINE

## 2018-10-23 PROCEDURE — 90471 IMMUNIZATION ADMIN: CPT | Performed by: INTERNAL MEDICINE

## 2018-10-23 RX ORDER — HYDROXYZINE HCL 10 MG/5 ML
50 SOLUTION, ORAL ORAL 3 TIMES DAILY
COMMUNITY
End: 2021-09-20

## 2018-10-23 RX ORDER — FLUOXETINE 20 MG/5ML
20 SOLUTION ORAL DAILY
COMMUNITY
End: 2019-05-28

## 2018-10-23 ASSESSMENT — PAIN SCALES - GENERAL: PAINLEVEL: NO PAIN (0)

## 2018-10-23 NOTE — LETTER
My Depression Action Plan  Name: Zak Weeks   Date of Birth 1975  Date: 10/23/2018    My doctor: Lucille Sapp   My clinic: [address]  [place] [address]  [phone]          GREEN    ZONE   Good Control    What it looks like:     Things are going generally well. You have normal up s and down s. You may even feel depressed from time to time, but bad moods usually last less than a day.   What you need to do:  1. Continue to care for yourself (see self care plan)  2. Check your depression survival kit and update it as needed  3. Follow your physician s recommendations including any medication.  4. Do not stop taking medication unless you consult with your physician first.           YELLOW         ZONE Getting Worse    What it looks like:     Depression is starting to interfere with your life.     It may be hard to get out of bed; you may be starting to isolate yourself from others.    Symptoms of depression are starting to last most all day and this has happened for several days.     You may have suicidal thoughts but they are not constant.   What you need to do:     1. Call your care team, your response to treatment will improve if you keep your care team informed of your progress. Yellow periods are signs an adjustment may need to be made.     2. Continue your self-care, even if you have to fake it!    3. Talk to someone in your support network    4. Open up your depression survival kit           RED    ZONE Medical Alert - Get Help    What it looks like:     Depression is seriously interfering with your life.     You may experience these or other symptoms: You can t get out of bed most days, can t work or engage in other necessary activities, you have trouble taking care of basic hygiene, or basic responsibilities, thoughts of suicide or death that will not go away, self-injurious behavior.     What you need to do:  1. Call your care team and  request a same-day appointment. If they are not available (weekends or after hours) call your local crisis line, emergency room or 911.            Depression Self Care Plan / Survival Kit    Self-Care for Depression  Here s the deal. Your body and mind are really not as separate as most people think.  What you do and think affects how you feel and how you feel influences what you do and think. This means if you do things that people who feel good do, it will help you feel better.  Sometimes this is all it takes.  There is also a place for medication and therapy depending on how severe your depression is, so be sure to consult with your medical provider and/ or Behavioral Health Consultant if your symptoms are worsening or not improving.     In order to better manage my stress, I will:    Exercise  Get some form of exercise, every day. This will help reduce pain and release endorphins, the  feel good  chemicals in your brain. This is almost as good as taking antidepressants!  This is not the same as joining a gym and then never going! (they count on that by the way ) It can be as simple as just going for a walk or doing some gardening, anything that will get you moving.      Hygiene   Maintain good hygiene (Get out of bed in the morning, Make your bed, Brush your teeth, Take a shower, and Get dressed like you were going to work, even if you are unemployed).  If your clothes don't fit try to get ones that do.    Diet  I will strive to eat foods that are good for me, drink plenty of water, and avoid excessive sugar, caffeine, alcohol, and other mood-altering substances.  Some foods that are helpful in depression are: complex carbohydrates, B vitamins, flaxseed, fish or fish oil, fresh fruits and vegetables.    Psychotherapy  I agree to participate in Individual Therapy (if recommended).    Medication  If prescribed medications, I agree to take them.  Missing doses can result in serious side effects.  I understand that  drinking alcohol, or other illicit drug use, may cause potential side effects.  I will not stop my medication abruptly without first discussing it with my provider.    Staying Connected With Others  I will stay in touch with my friends, family members, and my primary care provider/team.    Use your imagination  Be creative.  We all have a creative side; it doesn t matter if it s oil painting, sand castles, or mud pies! This will also kick up the endorphins.    Witness Beauty  (AKA stop and smell the roses) Take a look outside, even in mid-winter. Notice colors, textures. Watch the squirrels and birds.     Service to others  Be of service to others.  There is always someone else in need.  By helping others we can  get out of ourselves  and remember the really important things.  This also provides opportunities for practicing all the other parts of the program.    Humor  Laugh and be silly!  Adjust your TV habits for less news and crime-drama and more comedy.    Control your stress  Try breathing deep, massage therapy, biofeedback, and meditation. Find time to relax each day.     My support system    Clinic Contact:  Phone number:    Contact 1:  Phone number:    Contact 2:  Phone number:    Yarsani/:  Phone number:    Therapist:  Phone number:    Local crisis center:    Phone number:    Other community support:  Phone number:

## 2018-10-23 NOTE — MR AVS SNAPSHOT
After Visit Summary   10/23/2018    Zak Weeks    MRN: [id number]           Patient Information     Date Of Birth          1975        Visit Information        Provider Department      10/23/2018 11:10 AM Hugo Gracia MD [facility]        Today's Diagnoses     Gastroesophageal reflux disease, esophagitis presence not specified    -  1    Need for prophylactic vaccination and inoculation against influenza        Need for prophylactic vaccination with tetanus-diphtheria (Td)        Screen for STD (sexually transmitted disease)        CARDIOVASCULAR SCREENING; LDL GOAL LESS THAN 160        Traumatic brain injury with loss of consciousness, subsequent encounter          Care Instructions    [facility]    If you have any questions regarding to your visit please contact your care team:     Team Pink:   Clinic Hours Telephone Number   Internal Medicine:  Dr. Joanna Jackson NP 7am-7pm  Monday - Thursday   7am-5pm  Fridays  [phone]  (Appointment scheduling available 24/7)   Urgent Care - [place] and [facility] - 11am-9pm Monday-Friday Saturday-Sunday- 9am-5pm   [place] - 5pm-9pm Monday-Friday Saturday-Sunday- 9am-5pm  [phone] - [place]  [phone] - [place]       What options do I have for a visit other than an office visit? We offer electronic visits (e-visits) and telephone visits, when medically appropriate.  Please check with your medical insurance to see if these types of visits are covered, as you will be responsible for any charges that are not paid by your insurance.      You can use [employer] (secure electronic communication) to access to your chart, send your primary care provider a message, or make an appointment. Ask a team member how to get started.     For a price quote for your services, please call our Consumer Price Line at [phone] or our Imaging Cost estimation line at [phone]  "(for imaging tests).            Follow-ups after your visit        Who to contact     If you have questions or need follow up information about today's clinic visit or your schedule please contact [facility] directly at [phone].  Normal or non-critical lab and imaging results will be communicated to you by MyChart, letter or phone within 4 business days after the clinic has received the results. If you do not hear from us within 7 days, please contact the clinic through MyChart or phone. If you have a critical or abnormal lab result, we will notify you by phone as soon as possible.  Submit refill requests through [employer] or call your pharmacy and they will forward the refill request to us. Please allow 3 business days for your refill to be completed.          Additional Information About Your Visit        Care EveryWhere ID     This is your Care EveryWhere ID. This could be used by other organizations to access your [facility] medical records  [id number]        Your Vitals Were     Pulse Temperature Respirations Height Pulse Oximetry BMI (Body Mass Index)    71 97.8  F (36.6  C) (Oral) 20 5' 9\" (1.753 m) 98% 27.08 kg/m2       Blood Pressure from Last 3 Encounters:   10/23/18 112/68   03/28/18 116/82   02/23/18 106/72    Weight from Last 3 Encounters:   10/23/18 183 lb 6.4 oz (83.2 kg)   03/28/18 207 lb 12.8 oz (94.3 kg)   02/23/18 212 lb 11.2 oz (96.5 kg)              We Performed the Following     Basic metabolic panel     FLU VACCINE, SPLIT VIRUS, IM (QUADRIVALENT) [72651]- >3 YRS     Hepatitis C antibody     HIV Antigen Antibody Combo     Lipid panel reflex to direct LDL Fasting     TDAP VACCINE (ADACEL)     Vaccine Administration, Initial [24685]          Where to get your medicines      These medications were sent to [facility] - [place], [place] - [address]  [address] [facility] [address]     Phone:  [phone]     omeprazole 20 MG CR capsule          " Primary Care Provider Office Phone # Fax #    LISET Watson [phone]       XXX RESIGNED  [place] [address]        Equal Access to Services     SILVANO RODRIGUES : Sandee stone ku hadhenriqueo Sonilsaali, waaxda luqadaha, qaybta kaalmada adeegyada, lawrence layn raquel jones miguel ruiz. So [facility] [phone].    ATENCIÓN: Si habla español, tiene a beasley disposición servicios gratuitos de asistencia lingüística. Llame al [phone].    We comply with applicable federal civil rights laws and [place] laws. We do not discriminate on the basis of race, color, national origin, age, disability, sex, sexual orientation, or gender identity.            Thank you!     Thank you for choosing [facility]  for your care. Our goal is always to provide you with excellent care. Hearing back from our patients is one way we can continue to improve our services. Please take a few minutes to complete the written survey that you may receive in the mail after your visit with us. Thank you!             Your Updated Medication List - Protect others around you: Learn how to safely use, store and throw away your medicines at www.disposemymeds.org.          This list is accurate as of 10/23/18 11:42 AM.  Always use your most recent med list.                   Brand Name Dispense Instructions for use Diagnosis    benztropine 1 MG tablet    COGENTIN     1 mg 3 times daily as needed    Schizoaffective disorder, chronic condition (H), Moderate recurrent major depression (H)       buPROPion 150 MG 12 hr tablet    WELLBUTRIN SR    60 tablet    Take 1 tablet a day for 5 days then increase to 1 tablet twice a day    Tobacco abuse       clonazePAM 0.5 MG tablet    klonoPIN     0.5 mg 2 times daily as needed    Schizoaffective disorder, chronic condition (H), Moderate recurrent major depression (H)       divalproex sodium delayed-release 250 MG DR tablet    DEPAKOTE     Take 750 mg by mouth 3  times daily    Schizoaffective disorder, chronic condition (H), Moderate recurrent major depression (H)       FLUoxetine 20 MG/5ML solution    PROzac     Take 20 mg by mouth daily        hydrOXYzine 10 MG/5ML syrup    ATARAX     Take 50 mg by mouth 3 times daily        hydrOXYzine 50 MG capsule    VISTARIL     50 mg 3 times daily as needed    Schizoaffective disorder, chronic condition (H), Moderate recurrent major depression (H)       omeprazole 20 MG CR capsule    priLOSEC    90 capsule    Take 1 capsule (20 mg) by mouth daily    Gastroesophageal reflux disease, esophagitis presence not specified       paliperidone 234 MG/1.5ML Susp    INVEGA SUSTENNA     Inject 234 mg into the muscle every 28 days    Schizoaffective disorder, chronic condition (H), Moderate recurrent major depression (H)       QUEtiapine 50 MG tablet    SEROquel     Take 25 mg by mouth daily    Schizoaffective disorder, chronic condition (H), Moderate recurrent major depression (H)       risperiDONE 2 MG ODT tab    risperDAL M-TABS     Place 2 mg under the tongue 2 times daily as needed    Schizoaffective disorder, chronic condition (H), Moderate recurrent major depression (H)

## 2018-10-23 NOTE — PATIENT INSTRUCTIONS
[facility]    If you have any questions regarding to your visit please contact your care team:     Team Pink:   Clinic Hours Telephone Number   Internal Medicine:  Dr. Joanna Jackson NP 7am-7pm  Monday - Thursday   7am-5pm  Fridays  [phone]  (Appointment scheduling available 24/7)   Urgent Care - [place] and [facility] - 11am-9pm Monday-Friday Saturday-Sunday- 9am-5pm   [place] - 5pm-9pm Monday-Friday Saturday-Sunday- 9am-5pm  [phone] - [place]  [phone] - [place]       What options do I have for a visit other than an office visit? We offer electronic visits (e-visits) and telephone visits, when medically appropriate.  Please check with your medical insurance to see if these types of visits are covered, as you will be responsible for any charges that are not paid by your insurance.      You can use [employer] (secure electronic communication) to access to your chart, send your primary care provider a message, or make an appointment. Ask a team member how to get started.     For a price quote for your services, please call our Consumer Price Line at [phone] or our Imaging Cost estimation line at [phone] (for imaging tests).

## 2018-10-24 LAB
HCV AB SERPL QL IA: NONREACTIVE
HIV 1+2 AB+HIV1 P24 AG SERPL QL IA: NONREACTIVE

## 2018-10-24 ASSESSMENT — PATIENT HEALTH QUESTIONNAIRE - PHQ9: SUM OF ALL RESPONSES TO PHQ QUESTIONS 1-9: 3

## 2018-10-25 RX ORDER — PRAVASTATIN SODIUM 10 MG
10 TABLET ORAL DAILY
Qty: 90 TABLET | Refills: 3 | Status: SHIPPED | OUTPATIENT
Start: 2018-10-25 | End: 2019-05-28

## 2019-01-17 ENCOUNTER — TELEPHONE (OUTPATIENT)
Dept: FAMILY MEDICINE | Facility: CLINIC | Age: 44
End: 2019-01-17

## 2019-01-17 DIAGNOSIS — B78.9 STRONGYLOIDIASIS, UNSPECIFIED: Primary | ICD-10-CM

## 2019-01-17 NOTE — TELEPHONE ENCOUNTER
Reason for call:  Patient reporting a symptom    Symptom or request: Patient had possible exposure to parasite strongyloides. Request for lab for patient to be tested for a parasite strongyloides. .     Duration (how long have symptoms been present): Unknown    Have you been treated for this before? No    Additional comments Patient had possible exposure to parasite strongyloides. Request for lab for patient to be tested for a parasite strongyloides.   Phone Number patient can be reached at:  Other phone number:    phillip  ([facility]) [phone]       Best Time:  ASAP    Can we leave a detailed message on this number:  YES    Call taken on 1/17/2019 at 11:53 AM by Ysamin Pedraza

## 2019-01-17 NOTE — TELEPHONE ENCOUNTER
"Strongyloides stercoralis [threadworm] is generally infecting people with human immunodeficiency virus ( HIV ) . For a normal immune-competent host the question is what symptoms is he having ? Testing for Strongyloides stercoralis [threadworm] isn't completely simple. Here's from the [facility] Disease [facility] in [place], [place]       Disease  The symptomatic spectrum of Strongyloides ranges from subclinical in acute and chronic infection to severe and fatal in hyperinfection syndrome and disseminated strongyloidiasis, which have case-fatality rates that approach 90%. In either case, patients  symptoms are a result of the parasite s larval form migrating through various organs of the body.    Acute strongyloidiasis  The initial sign of acute strongyloidiasis, if noticed at all, is a localized pruritic, erythematous rash at the site of skin penetration. Patients may then develop tracheal irritation and a dry cough as the larvae migrate from the lungs up through the trachea. After the larvae are swallowed into the gastrointestinal tract, patients may experience diarrhea, constipation, abdominal pain, and anorexia.    SO; my recommendations ? Lets just do the serologic testing for Strongyloides stercoralis [threadworm] and this will be positive if there is ANY infection . If POSITIVE we can take this to the next step, but please do get more history, WHO had this infection ? How did patient get this \" exposure\".     Hugo Gracia MD      "

## 2019-01-17 NOTE — TELEPHONE ENCOUNTER
LM on Naga's VM requesting that he call back to discuss and provide more info    Jessica Millan RN

## 2019-01-18 NOTE — TELEPHONE ENCOUNTER
"Called and was advised that [facility] will not be in the office until Monday  Was advised to call back and leave a VM    Called back and LM on their VM requesting that Naga call back to provide further info      \"SO; my recommendations ? Lets just do the serologic testing for Strongyloides stercoralis [threadworm] and this will be positive if there is ANY infection . If POSITIVE we can take this to the next step, but please do get more history, WHO had this infection ? How did patient get this \" exposure\".      Hugo Gracia MD \"    Jessica Millan RN    "

## 2019-01-21 DIAGNOSIS — B78.9 STRONGYLOIDIASIS, UNSPECIFIED: ICD-10-CM

## 2019-01-21 PROCEDURE — 99000 SPECIMEN HANDLING OFFICE-LAB: CPT | Performed by: INTERNAL MEDICINE

## 2019-01-21 PROCEDURE — 36415 COLL VENOUS BLD VENIPUNCTURE: CPT | Performed by: INTERNAL MEDICINE

## 2019-01-21 PROCEDURE — 86682 HELMINTH ANTIBODY: CPT | Mod: 90 | Performed by: INTERNAL MEDICINE

## 2019-01-21 NOTE — TELEPHONE ENCOUNTER
BASSAM Nielson supervisor KM on RN hotline  Called him back    He stated that one of the residents that lives in the home with patient was confirmed positive for Strongyloides stercoralis recently  They are unsure of when patient was exposed to this as they are unsure when this all started   Patient currently has no s/s of this   However, as a precautionary measure, they would like patient tested for this asap, today if possible  Not only for patient's sake but for the safety of the staff and other patients  Please place lab orders    Jessica Millan RN

## 2019-01-24 ENCOUNTER — TELEPHONE (OUTPATIENT)
Dept: FAMILY MEDICINE | Facility: CLINIC | Age: 44
End: 2019-01-24

## 2019-01-24 DIAGNOSIS — B78.9 STRONGYLOIDIASIS, UNSPECIFIED: Primary | ICD-10-CM

## 2019-01-24 DIAGNOSIS — Z20.7 EXPOSURE TO PARASITIC DISEASE: ICD-10-CM

## 2019-01-24 LAB — STRONGYLOIDES IGG SER IA-ACNC: 1 IV

## 2019-01-24 NOTE — TELEPHONE ENCOUNTER
Reason for Call:  Other call back    Detailed comments:  Results of labs done on 1-21-19. Please call luke with the results.     Phone Number Patient can be reached at: Other phone number:  [phone]     Best Time:  Any     Can we leave a detailed message on this number? YES    Call taken on 1/24/2019 at 3:10 PM by Cindy Ayon

## 2019-01-25 NOTE — TELEPHONE ENCOUNTER
The Strongyloides stercoralis [threadworm] test is equivocal. What the laboratory has recommended we do is recheck this test in 2-4 weeks.    Lets set this up and please inform, patient of this    Hugo Gracia MD

## 2019-01-25 NOTE — TELEPHONE ENCOUNTER
Spoke with Naga () and gave result note below.  He is wondering if there is any way to test patient sooner than the recommenced 2-4 weeks.  If patient waits to be retested he could potentially expose the Stongyloids Stercoralis to other residents or staff.  Naga offered to bring patient in today to have blood testing or if stool testing is needed.    He would also like results faxed to [phone] (same as call number)  Please advise   Ellen Frazier RN

## 2019-01-25 NOTE — TELEPHONE ENCOUNTER
Please help me to determine if there is a reliable more sensitive testing for a patient with an equivocal IgG for Strongyloides stercoralis [threadworm]     He was recommended to be retested in 2-4 weeks. Patient has tremendous concerns and doesn't want to wait this 2-4 week period. I want you to review this case with laboratory. I can talk with laboratory staff later today about this case. Patient should be reminded of the following '     From the [facility] in [place], [place]     Acute strongyloidiasis    The initial sign of acute strongyloidiasis, if noticed at all, is a localized pruritic, erythematous rash at the site of skin penetration. Patients may then develop tracheal irritation and a dry cough as the larvae migrate from the lungs up through the trachea. After the larvae are swallowed into the gastrointestinal tract, patients may experience diarrhea, constipation, abdominal pain, and anorexia.    If a patient is entirely asymptomatic the likelihood of an actual Strongyloides stercoralis [threadworm] infection is extremely remote.    If this doesn't allay patients concerns I will call him later today . Lets     1. Get a status report from patient   2. Get some laboratory feedback - my sense is that is equivocal IgG test is non specific    Hugo Gracia MD

## 2019-01-25 NOTE — TELEPHONE ENCOUNTER
[facility] not in until 8 AM   Patient was updated with results  He stated that [facility] schedules appts for him   Patient will have [facility] call us back   RN hotline number [phone] given    Lab order placed    Per CDC recommendations for spongyloidiasis: standard precautions should be practiced. Wear gloves, gowns, good hand hygiene. Diligent handwashing required when in contact with feces    Jessica Millan RN

## 2019-01-25 NOTE — TELEPHONE ENCOUNTER
Per lab, the blood test that was ordered would be the best way to test and confirm diagnosis  They were unable to find any other tests    Per CDC website, stool tests can also be done but this may not always detect the worms in infected individuals    Jessica Millan RN

## 2019-01-25 NOTE — TELEPHONE ENCOUNTER
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     The IgG was equivocal. Patient agrees to return to the laboratory for recheck as it is ordered    See prior telephone message. Patient is to return to the laboratory for retesting 2-4 weeks from initial IgG     Hugo Gracia MD

## 2019-05-26 ENCOUNTER — TRANSFERRED RECORDS (OUTPATIENT)
Dept: HEALTH INFORMATION MANAGEMENT | Facility: CLINIC | Age: 44
End: 2019-05-26

## 2019-05-28 ENCOUNTER — OFFICE VISIT (OUTPATIENT)
Dept: FAMILY MEDICINE | Facility: CLINIC | Age: 44
End: 2019-05-28
Payer: COMMERCIAL

## 2019-05-28 ENCOUNTER — TELEPHONE (OUTPATIENT)
Dept: FAMILY MEDICINE | Facility: CLINIC | Age: 44
End: 2019-05-28

## 2019-05-28 VITALS
SYSTOLIC BLOOD PRESSURE: 100 MMHG | OXYGEN SATURATION: 98 % | DIASTOLIC BLOOD PRESSURE: 76 MMHG | HEART RATE: 62 BPM | TEMPERATURE: 97.8 F | WEIGHT: 202 LBS | HEIGHT: 70 IN | RESPIRATION RATE: 20 BRPM | BODY MASS INDEX: 28.92 KG/M2

## 2019-05-28 DIAGNOSIS — K21.9 GASTROESOPHAGEAL REFLUX DISEASE, ESOPHAGITIS PRESENCE NOT SPECIFIED: ICD-10-CM

## 2019-05-28 DIAGNOSIS — Z85.028 HISTORY OF GASTRIC CANCER: ICD-10-CM

## 2019-05-28 DIAGNOSIS — Z00.00 ROUTINE HISTORY AND PHYSICAL EXAMINATION OF ADULT: Primary | ICD-10-CM

## 2019-05-28 DIAGNOSIS — F17.210 CIGARETTE SMOKER: ICD-10-CM

## 2019-05-28 DIAGNOSIS — E78.5 HYPERLIPIDEMIA LDL GOAL <130: ICD-10-CM

## 2019-05-28 LAB
ANION GAP SERPL CALCULATED.3IONS-SCNC: 6 MMOL/L (ref 3–14)
BASOPHILS # BLD AUTO: 0.1 10E9/L (ref 0–0.2)
BASOPHILS NFR BLD AUTO: 0.6 %
BUN SERPL-MCNC: 14 MG/DL (ref 7–30)
CALCIUM SERPL-MCNC: 9.2 MG/DL (ref 8.5–10.1)
CHLORIDE SERPL-SCNC: 107 MMOL/L (ref 94–109)
CHOLEST SERPL-MCNC: 224 MG/DL
CO2 SERPL-SCNC: 24 MMOL/L (ref 20–32)
CREAT SERPL-MCNC: 1.04 MG/DL (ref 0.66–1.25)
CRP SERPL-MCNC: <2.9 MG/L (ref 0–8)
DIFFERENTIAL METHOD BLD: NORMAL
EOSINOPHIL # BLD AUTO: 0.2 10E9/L (ref 0–0.7)
EOSINOPHIL NFR BLD AUTO: 3 %
ERYTHROCYTE [DISTWIDTH] IN BLOOD BY AUTOMATED COUNT: 13.1 % (ref 10–15)
ERYTHROCYTE [SEDIMENTATION RATE] IN BLOOD BY WESTERGREN METHOD: 7 MM/H (ref 0–15)
GFR SERPL CREATININE-BSD FRML MDRD: 86 ML/MIN/{1.73_M2}
GLUCOSE SERPL-MCNC: 99 MG/DL (ref 70–99)
HCT VFR BLD AUTO: 46.7 % (ref 40–53)
HDLC SERPL-MCNC: 48 MG/DL
HGB BLD-MCNC: 15.6 G/DL (ref 13.3–17.7)
LDLC SERPL CALC-MCNC: 138 MG/DL
LYMPHOCYTES # BLD AUTO: 2.4 10E9/L (ref 0.8–5.3)
LYMPHOCYTES NFR BLD AUTO: 29.3 %
MCH RBC QN AUTO: 31.2 PG (ref 26.5–33)
MCHC RBC AUTO-ENTMCNC: 33.4 G/DL (ref 31.5–36.5)
MCV RBC AUTO: 93 FL (ref 78–100)
MONOCYTES # BLD AUTO: 0.7 10E9/L (ref 0–1.3)
MONOCYTES NFR BLD AUTO: 9.1 %
NEUTROPHILS # BLD AUTO: 4.7 10E9/L (ref 1.6–8.3)
NEUTROPHILS NFR BLD AUTO: 58 %
NONHDLC SERPL-MCNC: 176 MG/DL
PLATELET # BLD AUTO: 233 10E9/L (ref 150–450)
POTASSIUM SERPL-SCNC: 4.1 MMOL/L (ref 3.4–5.3)
RBC # BLD AUTO: 5 10E12/L (ref 4.4–5.9)
SODIUM SERPL-SCNC: 137 MMOL/L (ref 133–144)
TRIGL SERPL-MCNC: 192 MG/DL
WBC # BLD AUTO: 8.1 10E9/L (ref 4–11)

## 2019-05-28 PROCEDURE — 36415 COLL VENOUS BLD VENIPUNCTURE: CPT | Performed by: INTERNAL MEDICINE

## 2019-05-28 PROCEDURE — 80061 LIPID PANEL: CPT | Performed by: INTERNAL MEDICINE

## 2019-05-28 PROCEDURE — 99396 PREV VISIT EST AGE 40-64: CPT | Performed by: INTERNAL MEDICINE

## 2019-05-28 PROCEDURE — 86140 C-REACTIVE PROTEIN: CPT | Performed by: INTERNAL MEDICINE

## 2019-05-28 PROCEDURE — 85025 COMPLETE CBC W/AUTO DIFF WBC: CPT | Performed by: INTERNAL MEDICINE

## 2019-05-28 PROCEDURE — 80048 BASIC METABOLIC PNL TOTAL CA: CPT | Performed by: INTERNAL MEDICINE

## 2019-05-28 PROCEDURE — 85652 RBC SED RATE AUTOMATED: CPT | Performed by: INTERNAL MEDICINE

## 2019-05-28 RX ORDER — HYDROXYZINE HYDROCHLORIDE 50 MG/1
TABLET, FILM COATED ORAL
COMMUNITY
Start: 2019-05-13 | End: 2021-09-20

## 2019-05-28 RX ORDER — FLUOXETINE 40 MG/1
CAPSULE ORAL
COMMUNITY
Start: 2019-05-14 | End: 2019-08-30

## 2019-05-28 RX ORDER — ACETAMINOPHEN 160 MG
TABLET,DISINTEGRATING ORAL
COMMUNITY
Start: 2018-11-28 | End: 2021-09-20

## 2019-05-28 RX ORDER — PRAVASTATIN SODIUM 10 MG
10 TABLET ORAL DAILY
Qty: 90 TABLET | Refills: 3 | Status: SHIPPED | OUTPATIENT
Start: 2019-05-28 | End: 2021-09-20

## 2019-05-28 ASSESSMENT — PATIENT HEALTH QUESTIONNAIRE - PHQ9: SUM OF ALL RESPONSES TO PHQ QUESTIONS 1-9: 0

## 2019-05-28 ASSESSMENT — MIFFLIN-ST. JEOR: SCORE: 1799.58
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
## 2019-05-28 NOTE — PROGRESS NOTES
Last appointment with me was 10-    Preventative healthcare maintenance goals including      Health Maintenance   Topic Date Due     LIPID  02/03/2009     PREVENTIVE CARE VISIT  08/19/2015     PHQ-9  04/23/2019     ZOSTER IMMUNIZATION (1 of 2) 02/03/2024     DTAP/TDAP/TD IMMUNIZATION (2 - Td) 10/23/2028     HIV SCREENING  Completed     DEPRESSION ACTION PLAN  Completed     INFLUENZA VACCINE  Completed     IPV IMMUNIZATION  Aged Out     MENINGITIS IMMUNIZATION  Aged Out

## 2019-05-28 NOTE — TELEPHONE ENCOUNTER
Patient would like to  the forms later today when completed. Does not won't it fax. Please call patient when forms are completed and ready for .  Bridgett Cornelius CMA on 5/28/2019 at 9:47 AM

## 2019-05-28 NOTE — LETTER
[address] [place] [address]    May 30, 2019    Zak Weeks  [address]  [place] [address]          Dear Zak,    All of these tests are within acceptable limits / things look OK !      Enclosed is a copy of your results.     Results for orders placed or performed in visit on 05/28/19   Lipid panel reflex to direct LDL Fasting   Result Value Ref Range    Cholesterol 224 (H) <200 mg/dL    Triglycerides 192 (H) <150 mg/dL    HDL Cholesterol 48 >39 mg/dL    LDL Cholesterol Calculated 138 (H) <100 mg/dL    Non HDL Cholesterol 176 (H) <130 mg/dL   Basic metabolic panel   Result Value Ref Range    Sodium 137 133 - 144 mmol/L    Potassium 4.1 3.4 - 5.3 mmol/L    Chloride 107 94 - 109 mmol/L    Carbon Dioxide 24 20 - 32 mmol/L    Anion Gap 6 3 - 14 mmol/L    Glucose 99 70 - 99 mg/dL    Urea Nitrogen 14 7 - 30 mg/dL    Creatinine 1.04 0.66 - 1.25 mg/dL    GFR Estimate 86 >60 mL/min/[1.73_m2]    GFR Estimate If Black >90 >60 mL/min/[1.73_m2]    Calcium 9.2 8.5 - 10.1 mg/dL   Erythrocyte sedimentation rate auto   Result Value Ref Range    Sed Rate 7 0 - 15 mm/h   CRP inflammation   Result Value Ref Range    CRP Inflammation <2.9 0.0 - 8.0 mg/L   CBC with platelets differential   Result Value Ref Range    WBC 8.1 4.0 - 11.0 10e9/L    RBC Count 5.00 4.4 - 5.9 10e12/L    Hemoglobin 15.6 13.3 - 17.7 g/dL    Hematocrit 46.7 40.0 - 53.0 %    MCV 93 78 - 100 fl    MCH 31.2 26.5 - 33.0 pg    MCHC 33.4 31.5 - 36.5 g/dL    RDW 13.1 10.0 - 15.0 %    Platelet Count 233 150 - 450 10e9/L    % Neutrophils 58.0 %    % Lymphocytes 29.3 %    % Monocytes 9.1 %    % Eosinophils 3.0 %    % Basophils 0.6 %    Absolute Neutrophil 4.7 1.6 - 8.3 10e9/L    Absolute Lymphocytes 2.4 0.8 - 5.3 10e9/L    Absolute Monocytes 0.7 0.0 - 1.3 10e9/L    Absolute Eosinophils 0.2 0.0 - 0.7 10e9/L    Absolute Basophils 0.1 0.0 - 0.2 10e9/L    Diff Method Automated  Method        If you have any questions or concerns, please call myself or my nurse at [phone].      Sincerely,        Hugo Gracia MD/kristen

## 2019-05-30 ENCOUNTER — MEDICAL CORRESPONDENCE (OUTPATIENT)
Dept: HEALTH INFORMATION MANAGEMENT | Facility: CLINIC | Age: 44
End: 2019-05-30

## 2019-05-30 NOTE — TELEPHONE ENCOUNTER
Completed forms at  for .  Ivan Colvin ([phone]) (from [facility]) called and informed. Danyelle James,

## 2019-06-14 DIAGNOSIS — E55.9 HYPOVITAMINOSIS D: Primary | ICD-10-CM

## 2019-06-14 RX ORDER — CHOLECALCIFEROL (VITAMIN D3) 50 MCG
TABLET ORAL
Qty: 31 TABLET | Refills: 11 | Status: SHIPPED | OUTPATIENT
Start: 2019-06-14 | End: 2021-09-20

## 2019-06-14 NOTE — TELEPHONE ENCOUNTER
"Routing refill request to provider for review/approval because:  Medication is reported/historical    Requested Prescriptions   Pending Prescriptions Disp Refills     vitamin D3 (CHOLECALCIFEROL) 2000 units (50 mcg) tablet [Pharmacy Med Name: VITAMIN D3 2000IU TABS]  11     Sig: TAKE 1 TABLET BY MOUTH ONCE DAILY WITH FOOD (1 TOTAL  FILL)       Vitamin Supplements (Adult) Protocol Passed - 6/14/2019 11:32 AM        Passed - High dose Vitamin D not ordered        Passed - Recent (12 mo) or future (30 days) visit within the authorizing provider's specialty     Patient had office visit in the last 12 months or has a visit in the next 30 days with authorizing provider or within the authorizing provider's specialty.  See \"Patient Info\" tab in inbasket, or \"Choose Columns\" in Meds & Orders section of the refill encounter.              Passed - Medication is active on med list        Deanna Soto RN - BC      "

## 2019-08-30 DIAGNOSIS — F33.1 MODERATE RECURRENT MAJOR DEPRESSION (H): ICD-10-CM

## 2019-08-30 DIAGNOSIS — F25.9 SCHIZOAFFECTIVE DISORDER, CHRONIC CONDITION (H): ICD-10-CM

## 2019-08-30 NOTE — TELEPHONE ENCOUNTER
Controlled Substance Refill Request for Clonazepam 0.5mg tab  Problem List Complete:  No     PROVIDER TO CONSIDER COMPLETION OF PROBLEM LIST AND OVERVIEW/CONTROLLED SUBSTANCE AGREEMENT    Last Written Prescription Date:  03/05/2018  Last Fill Quantity: ?,   # refills: ?    THE MOST RECENT OFFICE VISIT MUST BE WITHIN THE PAST 3 MONTHS. AT LEAST ONE FACE TO FACE VISIT MUST OCCUR EVERY 6 MONTHS. ADDITIONAL VISITS CAN BE VIRTUAL.  (THIS STATEMENT SHOULD BE DELETED.)    Last Office Visit with [facility] primary care provider: 05/28/2019    Future Office visit:     Controlled substance agreement:   Encounter-Level CSA:    There are no encounter-level csa.     Patient-Level CSA:    There are no patient-level csa.         Last Urine Drug Screen: No results found for: CDAUT, No results found for: COMDAT, No results found for: THC13, PCP13, COC13, MAMP13, OPI13, AMP13, BZO13, TCA13, MTD13, BAR13, OXY13, PPX13, BUP13     Processing:  Fax Rx to [facility] pharmacy     [email or web address]       checked in past 3 months?  No, route to RN

## 2019-08-30 NOTE — TELEPHONE ENCOUNTER
Med listed as historical/reported  Who has been prescribing this?  Does he have a psychiatrist that is managing this?  Verify dose with patient    Jessica Millan RN

## 2019-08-30 NOTE — TELEPHONE ENCOUNTER
[place]: no concerns, last dispensed on 8/12/19 (script written on 6/7/19)    [place]  [email or web address]    Called [facility] pharmacy and spoke with Macrina  She verified that the prescription was for Clonazepam 0.5mg, 1 tab at [facility]   Patient also has Fluoxetine 40mg daily  Prescriptions pended    Jessica Millan RN

## 2019-08-30 NOTE — TELEPHONE ENCOUNTER
Pt was seeing Dr. Vanessa Núñez at [facility], and no longer seeing this provider for medication needed.       Flora KNAPP CMA ([facility])

## 2019-08-31 NOTE — TELEPHONE ENCOUNTER
I don't understand. [facility] is a mental health care facility. Aren't they refilling these prescriptions ?    Hugo Gracia MD

## 2019-09-04 RX ORDER — FLUOXETINE 40 MG/1
40 CAPSULE ORAL DAILY
Qty: 30 CAPSULE | Refills: 0 | Status: SHIPPED | OUTPATIENT
Start: 2019-09-04 | End: 2019-09-30

## 2019-09-04 RX ORDER — CLONAZEPAM 0.5 MG/1
0.5 TABLET ORAL AT BEDTIME
Qty: 30 TABLET | Refills: 0 | Status: SHIPPED | OUTPATIENT
Start: 2019-09-04 | End: 2021-09-20

## 2019-09-04 NOTE — TELEPHONE ENCOUNTER
Please see note below written by Flora Leslie CMA on 8/30/19  Patient no longer seeing provider at [facility]     Jessica Millan RN

## 2019-09-13 ENCOUNTER — TELEPHONE (OUTPATIENT)
Dept: FAMILY MEDICINE | Facility: CLINIC | Age: 44
End: 2019-09-13
Payer: COMMERCIAL

## 2019-09-13 ENCOUNTER — TELEPHONE (OUTPATIENT)
Dept: FAMILY MEDICINE | Facility: CLINIC | Age: 44
End: 2019-09-13

## 2019-09-13 NOTE — TELEPHONE ENCOUNTER
Reason for Call:  Other / Consent form    Detailed comments: [facility] called and stated patient was scheduled to see his PCP Dr Gracia at the [facility].  [facility] also stated that although patient does not recognize his condition, he has been diagnosed as a schizophrenia patient, and when asked to signed the consent forms today, he became aggressive and had the illusion that he was being recorded.  Naga also said that in the end and even though Dr Gracia told him he did not need to sign the forms, patient refused care.  [facility] is requesting a call back before patient's scheduled appointment with Dr Dangelo on Tuesday 9/17 @ 2pm, to discuss how to address the consent form at the upcoming visit.    Phone Number Patient can be reached at: [phone] ([facility]/Home Group Coordinator)    Best Time: ASAP    Can we leave a detailed message on this number? YES    Call taken on 9/13/2019 at 2:20 PM by Sammi Torres

## 2019-09-13 NOTE — TELEPHONE ENCOUNTER
"Patient arrived to his appointment with Dr. Gracia. Zak checked in with his  and as part of check in Zak was asked to sign a consent for services form. Zak refused and stated \"he's not signing anything\". The patient Rep had him have a seat so his  could go over what the consent for services is for and why he should sign it. Zak still refused to sign it, so they asked me to talk with him.    I talked with Zak and his  explaining the importance of signing the consent for services form and let Zak know that we will still see him today if he does not sign it, but Dr. Gracia and his team are really encouraging him to sign it. Zak looked at me and said \"Do you know what it means when your pupils are small\", I said no, and he stated \"that means you have no love\", I stated I'm sorry he felt that way.    Zak's  continued to talk with Zak letting him know we can still see him even though he doesn't sign the consent and that he needs refills and paperwork signed for the group home so he should be seen today. Zak then stated \"I don't trust Dr. Gracia and I am going to never see him again\" and then stated \"I don't trust this clinic and I will take a cab to a new one\". Zak's  informed him he was unable to do that and that he strongly suggested he be seen by Dr. Gracia today. Zak refused and wanted to leave. Zak's  apologized to me and asked if I could cancel his appointment. Zak looked at me and said \"have a nice day\".     I talked with Dr. rGacia and informed him of my conversation with Zak and his . Zak's appointment was cancelled today.     Jami Fletcher, Patient Information Supervisor  [facility]    "

## 2019-09-13 NOTE — TELEPHONE ENCOUNTER
Called and talked to [facility] care coordination.  He stated that patient is a paranoid schizophrenia and he does not believe that he has any form of mental illness.  He walked out on visit today before seeing PCP as he felt that the government is watching him when they asked for picture and wanted forms signed.    He was also on a civil commintment to follow up with psychologist and mandated to take meds. He has not taken any meds or now except Vitamin D, and Clonazapam at [facility], as he is no longer on civil commitment and can do what every he wants. He also refuses to be seen by psychiatrist.    He would like to bypass the forms and just have him roomed and seen by same nurse and provider only.    RN will room patient and have any forms  has, be signed if possible after patient is seen.  Also will talk to supervisor regarding requests.    Routed to provider.    Marley Dorado RN,BSN  [facility]

## 2019-09-30 DIAGNOSIS — F25.9 SCHIZOAFFECTIVE DISORDER, CHRONIC CONDITION (H): ICD-10-CM

## 2019-09-30 DIAGNOSIS — F33.1 MODERATE RECURRENT MAJOR DEPRESSION (H): ICD-10-CM

## 2019-09-30 NOTE — TELEPHONE ENCOUNTER
Controlled Substance Refill Request for clonazePAM (KLONOPIN) 0.5 MG tablet  Problem List Complete:  No     PROVIDER TO CONSIDER COMPLETION OF PROBLEM LIST AND OVERVIEW/CONTROLLED SUBSTANCE AGREEMENT    Last Written Prescription Date:  9/4/2019  Last Fill Quantity: 30,   # refills: 0    THE MOST RECENT OFFICE VISIT MUST BE WITHIN THE PAST 3 MONTHS. AT LEAST ONE FACE TO FACE VISIT MUST OCCUR EVERY 6 MONTHS. ADDITIONAL VISITS CAN BE VIRTUAL.  (THIS STATEMENT SHOULD BE DELETED.)    Last Office Visit with [facility] primary care provider: 9/3/2019    Future Office visit:     Controlled substance agreement:   Encounter-Level CSA:    There are no encounter-level csa.     Patient-Level CSA:    There are no patient-level csa.         Last Urine Drug Screen: No results found for: CDAUT, No results found for: COMDAT, No results found for: THC13, PCP13, COC13, MAMP13, OPI13, AMP13, BZO13, TCA13, MTD13, BAR13, OXY13, PPX13, BUP13      [email or web address]       checked in past 3 months?  No, route to RN

## 2019-10-02 RX ORDER — FLUOXETINE 40 MG/1
CAPSULE ORAL
Qty: 30 CAPSULE | Refills: 0 | Status: SHIPPED | OUTPATIENT
Start: 2019-10-02 | End: 2021-09-20

## 2019-10-02 NOTE — TELEPHONE ENCOUNTER
[place] reviewed:  reviewed- no concerns    [place]:  [email or web address];

## 2019-10-03 NOTE — TELEPHONE ENCOUNTER
Ma called [facility] pharmacy and informed pharmacy staff that patient is no longer receiving care from Dr. Gracia. All refill request should go to patient's new PCP or psychiatrist. RUDDY Houston

## 2019-10-03 NOTE — TELEPHONE ENCOUNTER
Patient has said he doesn't trust me / doesn't want to see me anymore . This is his prerogative however we can't do additional refills then. Perhaps his psychiatrist  Can do this one ?    Hugo Gracia MD

## 2019-10-04 RX ORDER — CLONAZEPAM 0.5 MG/1
TABLET ORAL
Refills: 5 | OUTPATIENT
Start: 2019-10-04

## 2019-10-23 DIAGNOSIS — F25.9 SCHIZOAFFECTIVE DISORDER, CHRONIC CONDITION (H): ICD-10-CM

## 2019-10-23 DIAGNOSIS — F33.1 MODERATE RECURRENT MAJOR DEPRESSION (H): ICD-10-CM

## 2019-10-28 NOTE — TELEPHONE ENCOUNTER
Called patient's number and it wasn't the correct number on file. Deleted number off demographics. Called home number and friend said he no longer lives with him and is in a group home but no number was given to call group home because friend don't know  Bridgett Cornelius CMA on 10/28/2019 at 9:35 AM

## 2019-10-29 NOTE — TELEPHONE ENCOUNTER
Called the pharmacy they provided the group home number. See below.    Phone [phone] group home number.    Called the group home number provided.     Left a message at the provided phone number:    Left a message the group home to call the clinic to schedule a appointment. Please help the patient schedule for Provider Follow up appointment. Patient was seen in the ED on 9/20/2019. Patient needed to follow up. Patient's last visit was on 5/28/2019. Rabia Nunez,

## 2019-10-29 NOTE — TELEPHONE ENCOUNTER
Will call the pharmacy to get more information or pass on the message below. Rabia Nunez,     [facility]. - [place], [place] - [address]  [address]  [facility] [address]  Phone: [phone] Fax: [phone]

## 2019-10-30 ENCOUNTER — TRANSFERRED RECORDS (OUTPATIENT)
Dept: HEALTH INFORMATION MANAGEMENT | Facility: CLINIC | Age: 44
End: 2019-10-30

## 2019-10-31 NOTE — TELEPHONE ENCOUNTER
2nd attempt. Please help the patient schedule for Provider Follow up appointment. Patient was seen in the ED on 9/20/2019. Patient needed to follow up. Patient's last visit was on 5/28/2019.  Bridgett Cornelius CMA on 10/31/2019 at 8:37 AM

## 2019-11-01 RX ORDER — FLUOXETINE 40 MG/1
CAPSULE ORAL
Qty: 30 CAPSULE | Refills: 0 | OUTPATIENT
Start: 2019-11-01

## 2019-11-01 NOTE — TELEPHONE ENCOUNTER
3rd attempt. Please close chart due to many attempts to reach patient. Left VM to return call to purple team

## 2021-09-10 PROBLEM — F15.20 AMPHETAMINE USE DISORDER, MODERATE (H): Status: ACTIVE | Noted: 2017-09-27

## 2021-09-14 ENCOUNTER — IMMUNIZATION (OUTPATIENT)
Dept: NURSING | Facility: CLINIC | Age: 46
End: 2021-09-14
Payer: COMMERCIAL

## 2021-09-14 PROCEDURE — 91300 PR COVID VAC PFIZER DIL RECON 30 MCG/0.3 ML IM: CPT

## 2021-09-14 PROCEDURE — 0001A PR COVID VAC PFIZER DIL RECON 30 MCG/0.3 ML IM: CPT

## 2021-09-20 ENCOUNTER — OFFICE VISIT (OUTPATIENT)
Dept: INTERNAL MEDICINE | Facility: CLINIC | Age: 46
End: 2021-09-20
Payer: COMMERCIAL

## 2021-09-20 VITALS
RESPIRATION RATE: 18 BRPM | SYSTOLIC BLOOD PRESSURE: 104 MMHG | BODY MASS INDEX: 29.26 KG/M2 | HEART RATE: 73 BPM | DIASTOLIC BLOOD PRESSURE: 72 MMHG | WEIGHT: 201 LBS | OXYGEN SATURATION: 98 % | TEMPERATURE: 98.4 F

## 2021-09-20 DIAGNOSIS — Z91.030 BEE STING ALLERGY: ICD-10-CM

## 2021-09-20 DIAGNOSIS — F25.9 SCHIZOAFFECTIVE DISORDER, CHRONIC CONDITION (H): Primary | ICD-10-CM

## 2021-09-20 PROCEDURE — 99214 OFFICE O/P EST MOD 30 MIN: CPT | Performed by: INTERNAL MEDICINE

## 2021-09-20 RX ORDER — MULTIVITAMIN
1 TABLET ORAL DAILY
Qty: 90 TABLET | Refills: 3 | Status: SHIPPED | OUTPATIENT
Start: 2021-09-20 | End: 2023-10-31

## 2021-09-20 RX ORDER — LORAZEPAM 1 MG/1
1 TABLET ORAL 3 TIMES DAILY PRN
COMMUNITY

## 2021-09-20 RX ORDER — EPINEPHRINE 0.3 MG/.3ML
0.3 INJECTION SUBCUTANEOUS ONCE
Qty: 0.3 ML | Refills: 0 | Status: SHIPPED | OUTPATIENT
Start: 2021-09-20 | End: 2021-09-20

## 2021-09-20 RX ORDER — MIRTAZAPINE 7.5 MG/1
7.5 TABLET, FILM COATED ORAL
COMMUNITY
Start: 2021-04-22

## 2021-09-20 RX ORDER — FAMOTIDINE 20 MG/1
20 TABLET, FILM COATED ORAL
COMMUNITY
Start: 2021-04-22 | End: 2022-01-05

## 2021-09-20 NOTE — PROGRESS NOTES
Assessment & Plan     Schizoaffective disorder, chronic condition (H)  Zak lives in a group home and has a mental health provider who manages his medications. He was interested in starting a multivitamin today which I did order. He declined any blood work.  - multivitamin (ONE-DAILY) tablet; Take 1 tablet by mouth daily    Bee sting allergy  Reports full body hives on previous stings. Thinks maybe the hives were worse the second time. Discussed rx'ing EpiPen to have just in case the next time he is stung he has an anaphylactic reaction and he was in agreement.  - EPINEPHrine (ANY BX GENERIC EQUIV) 0.3 MG/0.3ML injection 2-pack; Inject 0.3 mLs (0.3 mg) into the muscle once for 1 dose    Return in about 6 months (around 3/20/2022) for Physical Exam.    Minor Rivas MD  [facility]   Zak is a 46 year old who presents today to discuss scripts for multivitamin and Epi Pen. He is not sure why he's here but he tells me his group home wanted him to come and talk about that. He has no acute complaints. He declines any lab work or work-up today.    Review of Systems   Constitutional, gi, psych systems are negative, except as otherwise noted.      Objective    /72   Pulse 73   Temp 98.4  F (36.9  C) (Tympanic)   Resp 18   Wt 91.2 kg (201 lb)   SpO2 98%   BMI 29.26 kg/m    Body mass index is 29.26 kg/m .     Physical Exam   GENERAL: alert and in no distress. Flat affect.  EYES: conjunctivae/corneas clear. EOMs grossly intact  HENT: NC/AT, facies symmetric.  RESP: CTAB, no w/r/r.  CV: RRR, no m/r/g.  GI: NT, ND, without rebound tenderness or guarding  MSK: Moves all four extremities freely.  SKIN: No significant ulcers, lesions, or rashes on the visualized portions of the skin  NEURO: Alert. Oriented.

## 2021-10-05 ENCOUNTER — IMMUNIZATION (OUTPATIENT)
Dept: NURSING | Facility: CLINIC | Age: 46
End: 2021-10-05
Attending: INTERNAL MEDICINE
Payer: COMMERCIAL

## 2021-10-05 PROCEDURE — 0002A PR COVID VAC PFIZER DIL RECON 30 MCG/0.3 ML IM: CPT

## 2021-10-05 PROCEDURE — 91300 PR COVID VAC PFIZER DIL RECON 30 MCG/0.3 ML IM: CPT

## 2021-10-06 ENCOUNTER — MEDICAL CORRESPONDENCE (OUTPATIENT)
Dept: HEALTH INFORMATION MANAGEMENT | Facility: CLINIC | Age: 46
End: 2021-10-06
Payer: COMMERCIAL

## 2021-10-13 DIAGNOSIS — Z13.220 ENCOUNTER FOR SCREENING FOR LIPOID DISORDERS: ICD-10-CM

## 2021-10-13 DIAGNOSIS — Z79.899 HIGH RISK MEDICATION USE: Primary | ICD-10-CM

## 2021-10-21 ENCOUNTER — LAB (OUTPATIENT)
Dept: LAB | Facility: CLINIC | Age: 46
End: 2021-10-21
Payer: COMMERCIAL

## 2021-10-21 DIAGNOSIS — Z13.220 ENCOUNTER FOR SCREENING FOR LIPOID DISORDERS: ICD-10-CM

## 2021-10-21 DIAGNOSIS — Z79.899 HIGH RISK MEDICATION USE: ICD-10-CM

## 2021-10-21 LAB
CHOLEST SERPL-MCNC: 285 MG/DL
FASTING STATUS PATIENT QL REPORTED: YES
FASTING STATUS PATIENT QL REPORTED: YES
GLUCOSE BLD-MCNC: 108 MG/DL (ref 70–99)
HBA1C MFR BLD: 5.5 % (ref 0–5.6)
HDLC SERPL-MCNC: 48 MG/DL
LDLC SERPL CALC-MCNC: 213 MG/DL
NONHDLC SERPL-MCNC: 237 MG/DL
TRIGL SERPL-MCNC: 120 MG/DL

## 2021-10-21 PROCEDURE — 80061 LIPID PANEL: CPT

## 2021-10-21 PROCEDURE — 82947 ASSAY GLUCOSE BLOOD QUANT: CPT

## 2021-10-21 PROCEDURE — 36415 COLL VENOUS BLD VENIPUNCTURE: CPT

## 2021-10-21 PROCEDURE — 83036 HEMOGLOBIN GLYCOSYLATED A1C: CPT

## 2021-11-24 ENCOUNTER — MEDICAL CORRESPONDENCE (OUTPATIENT)
Dept: HEALTH INFORMATION MANAGEMENT | Facility: CLINIC | Age: 46
End: 2021-11-24
Payer: COMMERCIAL

## 2021-12-01 ENCOUNTER — OFFICE VISIT (OUTPATIENT)
Dept: INTERNAL MEDICINE | Facility: CLINIC | Age: 46
End: 2021-12-01
Payer: COMMERCIAL

## 2021-12-01 VITALS
SYSTOLIC BLOOD PRESSURE: 102 MMHG | HEART RATE: 88 BPM | OXYGEN SATURATION: 98 % | WEIGHT: 195.3 LBS | BODY MASS INDEX: 28.43 KG/M2 | DIASTOLIC BLOOD PRESSURE: 64 MMHG | TEMPERATURE: 98.7 F

## 2021-12-01 DIAGNOSIS — Z11.1 SCREENING FOR TUBERCULOSIS: ICD-10-CM

## 2021-12-01 DIAGNOSIS — Z11.59 ENCOUNTER FOR HEPATITIS C SCREENING TEST FOR LOW RISK PATIENT: ICD-10-CM

## 2021-12-01 DIAGNOSIS — Z11.4 ENCOUNTER FOR SCREENING FOR HIV: ICD-10-CM

## 2021-12-01 DIAGNOSIS — E78.5 HYPERLIPIDEMIA LDL GOAL <130: Primary | ICD-10-CM

## 2021-12-01 LAB
HCV AB SERPL QL IA: NONREACTIVE
HIV 1+2 AB+HIV1 P24 AG SERPL QL IA: NONREACTIVE

## 2021-12-01 PROCEDURE — 99214 OFFICE O/P EST MOD 30 MIN: CPT | Performed by: INTERNAL MEDICINE

## 2021-12-01 PROCEDURE — 86803 HEPATITIS C AB TEST: CPT | Performed by: INTERNAL MEDICINE

## 2021-12-01 PROCEDURE — 36415 COLL VENOUS BLD VENIPUNCTURE: CPT | Performed by: INTERNAL MEDICINE

## 2021-12-01 PROCEDURE — 87389 HIV-1 AG W/HIV-1&-2 AB AG IA: CPT | Performed by: INTERNAL MEDICINE

## 2021-12-01 PROCEDURE — 86481 TB AG RESPONSE T-CELL SUSP: CPT | Performed by: INTERNAL MEDICINE

## 2021-12-01 RX ORDER — EPINEPHRINE 0.3 MG/.3ML
INJECTION SUBCUTANEOUS
COMMUNITY
Start: 2021-09-20 | End: 2023-12-19

## 2021-12-01 RX ORDER — RISPERIDONE 3 MG/1
TABLET ORAL
COMMUNITY
Start: 2021-11-19

## 2021-12-01 RX ORDER — ATORVASTATIN CALCIUM 40 MG/1
40 TABLET, FILM COATED ORAL DAILY
Qty: 90 TABLET | Refills: 3 | Status: SHIPPED | OUTPATIENT
Start: 2021-12-01 | End: 2022-05-20

## 2021-12-01 ASSESSMENT — PATIENT HEALTH QUESTIONNAIRE - PHQ9
SUM OF ALL RESPONSES TO PHQ QUESTIONS 1-9: 1
SUM OF ALL RESPONSES TO PHQ QUESTIONS 1-9: 1
10. IF YOU CHECKED OFF ANY PROBLEMS, HOW DIFFICULT HAVE THESE PROBLEMS MADE IT FOR YOU TO DO YOUR WORK, TAKE CARE OF THINGS AT HOME, OR GET ALONG WITH OTHER PEOPLE: NOT DIFFICULT AT ALL

## 2021-12-02 LAB
GAMMA INTERFERON BACKGROUND BLD IA-ACNC: 0.08 IU/ML
M TB IFN-G BLD-IMP: POSITIVE
M TB IFN-G CD4+ BCKGRND COR BLD-ACNC: 9.92 IU/ML
MITOGEN IGNF BCKGRD COR BLD-ACNC: 0.49 IU/ML
MITOGEN IGNF BCKGRD COR BLD-ACNC: 0.55 IU/ML
QUANTIFERON MITOGEN: 10 IU/ML
QUANTIFERON NIL TUBE: 0.08 IU/ML
QUANTIFERON TB1 TUBE: 0.63 IU/ML
QUANTIFERON TB2 TUBE: 0.57

## 2021-12-02 ASSESSMENT — PATIENT HEALTH QUESTIONNAIRE - PHQ9: SUM OF ALL RESPONSES TO PHQ QUESTIONS 1-9: 1

## 2021-12-15 ENCOUNTER — TELEPHONE (OUTPATIENT)
Dept: INTERNAL MEDICINE | Facility: CLINIC | Age: 46
End: 2021-12-15
Payer: COMMERCIAL

## 2021-12-15 NOTE — TELEPHONE ENCOUNTER
Reason for Call:  Request for results:    Name of test or procedure: labs    Date of test of procedure: 12/01/21    Location of the test or procedure: [facility] lab    OK to leave the result message on voice mail or with a family member? YES    Phone number Patient can be reached at:  Other phone number:  [phone]    Additional comments: Wilder [facility] nurse mojgan called to request lab results from 12/01/21 be faxed to [phone]. They need lab results for patient discharge placement.     Call taken on 12/15/2021 at 3:17 PM by MINGO STEEN

## 2021-12-17 ENCOUNTER — MEDICAL CORRESPONDENCE (OUTPATIENT)
Dept: HEALTH INFORMATION MANAGEMENT | Facility: CLINIC | Age: 46
End: 2021-12-17

## 2021-12-17 ENCOUNTER — ANCILLARY PROCEDURE (OUTPATIENT)
Dept: GENERAL RADIOLOGY | Facility: CLINIC | Age: 46
End: 2021-12-17
Payer: COMMERCIAL

## 2021-12-17 DIAGNOSIS — Z11.1 SCREENING EXAMINATION FOR PULMONARY TUBERCULOSIS: ICD-10-CM

## 2021-12-17 DIAGNOSIS — Z11.1 SCREENING EXAMINATION FOR PULMONARY TUBERCULOSIS: Primary | ICD-10-CM

## 2021-12-17 PROCEDURE — 71046 X-RAY EXAM CHEST 2 VIEWS: CPT | Performed by: RADIOLOGY

## 2021-12-21 ENCOUNTER — TELEPHONE (OUTPATIENT)
Dept: INTERNAL MEDICINE | Facility: CLINIC | Age: 46
End: 2021-12-21
Payer: COMMERCIAL

## 2021-12-21 NOTE — TELEPHONE ENCOUNTER
KM on  for Gladis MCDONNELL at [facility]  --chest XR results faxed to [phone].  --please schedule a virtual visit with PCP to discuss treatment options.

## 2021-12-21 NOTE — TELEPHONE ENCOUNTER
Gladis from [facility] called, states that patient took tb test and had a chest x ray on Friday.     She needs a copy of the negative chest xray results and would like to know if patient is to take any medications to prevent active tb.     Gladis Griffiths requests call back to [phone].   Ok to leave voicemail

## 2022-01-05 DIAGNOSIS — K21.9 GASTROESOPHAGEAL REFLUX DISEASE, UNSPECIFIED WHETHER ESOPHAGITIS PRESENT: Primary | ICD-10-CM

## 2022-01-05 RX ORDER — FAMOTIDINE 20 MG/1
20 TABLET, FILM COATED ORAL 2 TIMES DAILY PRN
Qty: 90 TABLET | Refills: 1 | Status: SHIPPED | OUTPATIENT
Start: 2022-01-05 | End: 2022-05-16

## 2022-01-07 ENCOUNTER — VIRTUAL VISIT (OUTPATIENT)
Dept: INTERNAL MEDICINE | Facility: CLINIC | Age: 47
End: 2022-01-07
Payer: COMMERCIAL

## 2022-01-07 DIAGNOSIS — Z22.7 LATENT TUBERCULOSIS: Primary | ICD-10-CM

## 2022-01-07 PROCEDURE — 99214 OFFICE O/P EST MOD 30 MIN: CPT | Mod: 95 | Performed by: INTERNAL MEDICINE

## 2022-01-07 RX ORDER — RIFAMPIN 300 MG/1
600 CAPSULE ORAL DAILY
Qty: 240 CAPSULE | Refills: 0 | Status: SHIPPED | OUTPATIENT
Start: 2022-01-07 | End: 2022-05-20

## 2022-01-07 NOTE — PROGRESS NOTES
Zak is a 46 year old who is being evaluated via a billable video visit.      How would you like to obtain your AVS? MyChart  If the video visit is dropped, the invitation should be resent by: Text to cell phone: [phone]  Will anyone else be joining your video visit? No    Video Start Time: 11:21 AM    Assessment & Plan     Latent tuberculosis  Zak meets criteria for treatment and is interested in it. Will treat with four months of rifampin (4R) therapy. Discussed possible side effects, including discoloration of secretions, hepatotoxicity, confusion, etc. Will check CMP and CBC FDC through therapy. Letter sent to group home to remind them to schedule these labs.  - CBC; Future  - CMP; Future  - rifampin (RIFADIN) 300 MG capsule; Take 2 capsules (600 mg) by mouth daily TAKE EVERY DAY FOR FOUR MONTHS. DO NOT MISS A DOSE. TAKE AT SAME TIME AS ATORVASTATIN.    Return in about 6 months (around 7/7/2022) for Physical Exam.    Minor Rivas MD  [facility]   Zak is a 46 year old who presents via video visit to discuss his latent tuberculosis.  He had a positive QuantiFERON test on December 1 that was followed by negative chest x-ray on December 17.  He tells me he has never been treated for tuberculosis before.  He denies any cough, hemoptysis, fevers, or night sweats.  He is interested in treatment for this latent tuberculosis.    Review of Systems   Constitutional, pulmonary systems are negative, except as otherwise noted.      Objective    Vitals - Patient Reported  Weight (Patient Reported): 88.5 kg (195 lb)  Vitals: No vitals were obtained today due to virtual visit.    Physical Exam   GENERAL: Healthy, alert and no distress  EYES: Eyes grossly normal to inspection.  No discharge or erythema, or obvious scleral/conjunctival abnormalities.  RESP: No audible wheeze, cough, or visible cyanosis.  No visible retractions or increased work of breathing.    SKIN:  Visible skin clear. No significant rash, abnormal pigmentation or lesions.  NEURO: Cranial nerves grossly intact.  Mentation and speech appropriate for age.  PSYCH: Mentation appears normal, affect normal/bright, judgement and insight intact, normal speech and appearance well-groomed.      Video-Visit Details    Type of service: Video Visit    Video End Time: 11:26 AM    Originating Location (pt. Location): Assisted Living    Distant Location (provider location):  [facility]     Platform used for Video Visit: Pickie

## 2022-01-07 NOTE — LETTER
January 7, 2022      Zak Weeks  [address] [place] [facility] [place] [facility] [address]        To Whom It May Concern:    Zak Weeks was seen in our clinic. He has started treatment for latent tuberculosis with a four month daily regimen of rifampin, an antibiotic. He needs to have labs checked shelter through this therapy (some time in early March 2022). I have placed the order for these blood tests and you can make a non-fasting lab-only appointment via Al[employer]hart or by calling into our clinic. Zak does not need to see me again to have this blood work done as I have already placed the order.          Sincerely,        Minor Rivas MD, MPH  [facility]  Internal Medicine

## 2022-01-10 ENCOUNTER — MEDICAL CORRESPONDENCE (OUTPATIENT)
Dept: HEALTH INFORMATION MANAGEMENT | Facility: CLINIC | Age: 47
End: 2022-01-10
Payer: COMMERCIAL

## 2022-01-25 ENCOUNTER — TELEPHONE (OUTPATIENT)
Dept: INTERNAL MEDICINE | Facility: CLINIC | Age: 47
End: 2022-01-25
Payer: COMMERCIAL

## 2022-01-25 NOTE — TELEPHONE ENCOUNTER
Jean from [facility] calling [facility] [place].  States pt just started with them yesterday.    Advised all medications that have been prescribed by Dr. Whelan have been sent to [facility] pharmacy.     Jean can be reached at [phone].    Camille GARY RN  EP Triage

## 2022-05-09 DIAGNOSIS — Z22.7 LATENT TUBERCULOSIS: ICD-10-CM

## 2022-05-09 RX ORDER — RIFAMPIN 300 MG/1
600 CAPSULE ORAL DAILY
Qty: 240 CAPSULE | Refills: 0 | OUTPATIENT
Start: 2022-05-09

## 2022-05-09 NOTE — TELEPHONE ENCOUNTER
Routing refill request to provider for review/approval because:  Drug not on the [facility] refill protocol         Fide Rubio RN

## 2022-05-09 NOTE — TELEPHONE ENCOUNTER
This is only a four month regimen of rifampin. If he has taken four months, then he should STOP the medication.

## 2022-05-10 NOTE — TELEPHONE ENCOUNTER
Left detailed message for homecare on voicemail.  Also, spoke to company owner & informed him - tx was for 4 months only.

## 2022-05-14 ENCOUNTER — TELEPHONE (OUTPATIENT)
Dept: INTERNAL MEDICINE | Facility: CLINIC | Age: 47
End: 2022-05-14
Payer: COMMERCIAL

## 2022-05-14 DIAGNOSIS — K21.9 GASTROESOPHAGEAL REFLUX DISEASE, UNSPECIFIED WHETHER ESOPHAGITIS PRESENT: ICD-10-CM

## 2022-05-14 NOTE — TELEPHONE ENCOUNTER
Reason for Call:  Medication or medication refill:CANDY FROM [facility] NEEDS THE PT SAMODITINE 20MG TO BE REFILLED[facility]    Do you use a [facility]?  Name of the pharmacy and phone number for the current request:  SEE ABOVE    Name of the medication requested: SEE ABOVE    Other request: NA    Can we leave a detailed message on this number? [phone]-    Phone number patient can be reached at: ABOVE    Best Time: ANY    Call taken on 5/14/2022 at 10:19 AM by Deana Hilliard

## 2022-05-16 RX ORDER — FAMOTIDINE 20 MG/1
20 TABLET, FILM COATED ORAL 2 TIMES DAILY PRN
Qty: 90 TABLET | Refills: 1 | Status: SHIPPED | OUTPATIENT
Start: 2022-05-16 | End: 2022-06-09

## 2022-05-20 DIAGNOSIS — E78.5 HYPERLIPIDEMIA LDL GOAL <130: ICD-10-CM

## 2022-05-20 RX ORDER — ATORVASTATIN CALCIUM 40 MG/1
40 TABLET, FILM COATED ORAL DAILY
Qty: 90 TABLET | Refills: 2 | Status: SHIPPED | OUTPATIENT
Start: 2022-05-20 | End: 2023-10-31

## 2022-06-09 ENCOUNTER — TELEPHONE (OUTPATIENT)
Dept: INTERNAL MEDICINE | Facility: CLINIC | Age: 47
End: 2022-06-09
Payer: COMMERCIAL

## 2022-06-09 DIAGNOSIS — K21.9 GASTROESOPHAGEAL REFLUX DISEASE, UNSPECIFIED WHETHER ESOPHAGITIS PRESENT: ICD-10-CM

## 2022-06-09 RX ORDER — FAMOTIDINE 20 MG/1
20 TABLET, FILM COATED ORAL 2 TIMES DAILY
Qty: 90 TABLET | Refills: 1 | Status: SHIPPED | OUTPATIENT
Start: 2022-06-09 | End: 2022-09-20

## 2022-06-09 NOTE — TELEPHONE ENCOUNTER
Oriana nurse from [facility] calling for update in medication instructions. Reports that patient is needing to take the famotidine every day twice a day. Requesting that script be updated so that they are able to give to patient as a scheduled medication. Routing to PCP to review. Med updated to take BID. PCP to review and sign if appropriate.

## 2022-10-04 ENCOUNTER — MEDICAL CORRESPONDENCE (OUTPATIENT)
Dept: HEALTH INFORMATION MANAGEMENT | Facility: CLINIC | Age: 47
End: 2022-10-04

## 2023-10-10 DIAGNOSIS — F20.0 PARANOID SCHIZOPHRENIA, CHRONIC CONDITION (H): ICD-10-CM

## 2023-10-10 DIAGNOSIS — F25.9 SCHIZOAFFECTIVE DISORDER, CHRONIC CONDITION (H): ICD-10-CM

## 2023-10-10 DIAGNOSIS — E78.5 HYPERLIPIDEMIA LDL GOAL <130: ICD-10-CM

## 2023-10-10 DIAGNOSIS — Z79.899 HIGH RISK MEDICATION USE: Primary | ICD-10-CM

## 2023-10-10 DIAGNOSIS — K21.9 GASTROESOPHAGEAL REFLUX DISEASE, UNSPECIFIED WHETHER ESOPHAGITIS PRESENT: ICD-10-CM

## 2023-10-11 RX ORDER — MULTIVITAMIN WITH FOLIC ACID 400 MCG
1 TABLET ORAL DAILY
Qty: 31 TABLET | Refills: 11 | OUTPATIENT
Start: 2023-10-11

## 2023-10-11 RX ORDER — ATORVASTATIN CALCIUM 40 MG/1
40 TABLET, FILM COATED ORAL DAILY
Qty: 31 TABLET | Refills: 11 | OUTPATIENT
Start: 2023-10-11

## 2023-10-11 RX ORDER — FAMOTIDINE 20 MG/1
20 TABLET, FILM COATED ORAL 2 TIMES DAILY
Qty: 62 TABLET | Refills: 11 | OUTPATIENT
Start: 2023-10-11

## 2023-10-17 ENCOUNTER — LAB (OUTPATIENT)
Dept: LAB | Facility: CLINIC | Age: 48
End: 2023-10-17
Payer: COMMERCIAL

## 2023-10-17 DIAGNOSIS — F20.0 PARANOID SCHIZOPHRENIA, CHRONIC CONDITION (H): ICD-10-CM

## 2023-10-17 DIAGNOSIS — Z79.899 HIGH RISK MEDICATION USE: ICD-10-CM

## 2023-10-17 LAB
CHOLEST SERPL-MCNC: 140 MG/DL
FASTING STATUS PATIENT QL REPORTED: YES
GLUCOSE SERPL-MCNC: 108 MG/DL (ref 70–99)
HBA1C MFR BLD: 5.6 % (ref 0–5.6)
HDLC SERPL-MCNC: 41 MG/DL
LDLC SERPL CALC-MCNC: 74 MG/DL
NONHDLC SERPL-MCNC: 99 MG/DL
TRIGL SERPL-MCNC: 126 MG/DL

## 2023-10-17 PROCEDURE — 36415 COLL VENOUS BLD VENIPUNCTURE: CPT

## 2023-10-17 PROCEDURE — 83036 HEMOGLOBIN GLYCOSYLATED A1C: CPT

## 2023-10-17 PROCEDURE — 80061 LIPID PANEL: CPT

## 2023-10-17 PROCEDURE — 82947 ASSAY GLUCOSE BLOOD QUANT: CPT

## 2023-10-31 DIAGNOSIS — E78.5 HYPERLIPIDEMIA LDL GOAL <130: ICD-10-CM

## 2023-10-31 DIAGNOSIS — K21.9 GASTROESOPHAGEAL REFLUX DISEASE, UNSPECIFIED WHETHER ESOPHAGITIS PRESENT: ICD-10-CM

## 2023-10-31 DIAGNOSIS — F25.9 SCHIZOAFFECTIVE DISORDER, CHRONIC CONDITION (H): ICD-10-CM

## 2023-10-31 RX ORDER — ATORVASTATIN CALCIUM 40 MG/1
40 TABLET, FILM COATED ORAL DAILY
Qty: 60 TABLET | Refills: 0 | Status: SHIPPED | OUTPATIENT
Start: 2023-10-31 | End: 2024-01-30

## 2023-10-31 RX ORDER — MULTIVITAMIN
1 TABLET ORAL DAILY
Qty: 60 TABLET | Refills: 0 | Status: SHIPPED | OUTPATIENT
Start: 2023-10-31 | End: 2024-01-30

## 2023-10-31 RX ORDER — FAMOTIDINE 20 MG/1
20 TABLET, FILM COATED ORAL 2 TIMES DAILY
Qty: 120 TABLET | Refills: 0 | Status: SHIPPED | OUTPATIENT
Start: 2023-10-31 | End: 2024-01-30

## 2023-12-11 DIAGNOSIS — E78.5 HYPERLIPIDEMIA LDL GOAL <130: ICD-10-CM

## 2023-12-11 DIAGNOSIS — F25.9 SCHIZOAFFECTIVE DISORDER, CHRONIC CONDITION (H): ICD-10-CM

## 2023-12-11 DIAGNOSIS — K21.9 GASTROESOPHAGEAL REFLUX DISEASE, UNSPECIFIED WHETHER ESOPHAGITIS PRESENT: ICD-10-CM

## 2023-12-14 RX ORDER — MULTIVITAMIN WITH FOLIC ACID 400 MCG
TABLET ORAL
Qty: 30 TABLET | Refills: 11 | OUTPATIENT
Start: 2023-12-14

## 2023-12-14 RX ORDER — FAMOTIDINE 20 MG/1
TABLET, FILM COATED ORAL
Qty: 62 TABLET | Refills: 11 | OUTPATIENT
Start: 2023-12-14

## 2023-12-14 RX ORDER — ATORVASTATIN CALCIUM 40 MG/1
TABLET, FILM COATED ORAL
Qty: 31 TABLET | Refills: 11 | OUTPATIENT
Start: 2023-12-14

## 2023-12-14 NOTE — TELEPHONE ENCOUNTER
Patient no showed follow-up visit. Will need to attend an appointment prior to any additional refills.

## 2023-12-18 DIAGNOSIS — T78.40XD ALLERGIC REACTION, SUBSEQUENT ENCOUNTER: Primary | ICD-10-CM

## 2023-12-19 RX ORDER — EPINEPHRINE 0.3 MG/.3ML
INJECTION SUBCUTANEOUS
Qty: 2 EACH | Refills: 1 | Status: SHIPPED | OUTPATIENT
Start: 2023-12-19

## 2023-12-22 ENCOUNTER — TELEPHONE (OUTPATIENT)
Dept: INTERNAL MEDICINE | Facility: CLINIC | Age: 48
End: 2023-12-22
Payer: COMMERCIAL

## 2023-12-22 DIAGNOSIS — F25.9 SCHIZOAFFECTIVE DISORDER, CHRONIC CONDITION (H): ICD-10-CM

## 2023-12-22 DIAGNOSIS — K21.9 GASTROESOPHAGEAL REFLUX DISEASE, UNSPECIFIED WHETHER ESOPHAGITIS PRESENT: ICD-10-CM

## 2023-12-22 DIAGNOSIS — E78.5 HYPERLIPIDEMIA LDL GOAL <130: ICD-10-CM

## 2023-12-22 RX ORDER — ATORVASTATIN CALCIUM 40 MG/1
40 TABLET, FILM COATED ORAL DAILY
Qty: 60 TABLET | Refills: 0 | Status: CANCELLED | OUTPATIENT
Start: 2023-12-22

## 2023-12-22 RX ORDER — MULTIVITAMIN
1 TABLET ORAL DAILY
Qty: 60 TABLET | Refills: 0 | Status: CANCELLED | OUTPATIENT
Start: 2023-12-22

## 2023-12-22 RX ORDER — FAMOTIDINE 20 MG/1
20 TABLET, FILM COATED ORAL 2 TIMES DAILY
Qty: 120 TABLET | Refills: 0 | Status: CANCELLED | OUTPATIENT
Start: 2023-12-22

## 2023-12-22 NOTE — TELEPHONE ENCOUNTER
Patria, pt's home care nurse, called the clinic requesting a refill for pt.     Per chart review, refill requests were declined yesterday as pt is overdue for an appt.     Pt now has an appt scheduled for 1/30/24, and is requesting a carrie refill until that appt.     Routing to PCP for review. Medications pended. Routing HP as pt is out of medication.     Can we leave a detailed message on this number? YES  Phone number home care can be reached at: Other phone number: [phone] (Patria's manager)    Molly Chong, RN  [facility] Triage

## 2023-12-22 NOTE — TELEPHONE ENCOUNTER
I have not seen patient in ~2 years. Many carrie refills already given. He no showed recent scheduled visit. He needs an appointment prior to refills. This could be done with any provider.

## 2023-12-22 NOTE — TELEPHONE ENCOUNTER
Relayed providers message to Tameka [facility] staff. Tameka will discuss patient's current appointment with the group home RN and will call back if chooses to reschedule appt to be seen with another provider at a sooner date.

## 2024-01-30 ENCOUNTER — OFFICE VISIT (OUTPATIENT)
Dept: INTERNAL MEDICINE | Facility: CLINIC | Age: 49
End: 2024-01-30
Payer: COMMERCIAL

## 2024-01-30 ENCOUNTER — ORDERS ONLY (AUTO-RELEASED) (OUTPATIENT)
Dept: INTERNAL MEDICINE | Facility: CLINIC | Age: 49
End: 2024-01-30
Payer: COMMERCIAL

## 2024-01-30 VITALS
DIASTOLIC BLOOD PRESSURE: 70 MMHG | OXYGEN SATURATION: 99 % | TEMPERATURE: 97.8 F | BODY MASS INDEX: 28.11 KG/M2 | SYSTOLIC BLOOD PRESSURE: 108 MMHG | HEART RATE: 61 BPM | WEIGHT: 193.1 LBS

## 2024-01-30 DIAGNOSIS — Z23 ENCOUNTER FOR IMMUNIZATION: ICD-10-CM

## 2024-01-30 DIAGNOSIS — F25.9 SCHIZOAFFECTIVE DISORDER, CHRONIC CONDITION (H): ICD-10-CM

## 2024-01-30 DIAGNOSIS — F17.200 TOBACCO USE DISORDER: ICD-10-CM

## 2024-01-30 DIAGNOSIS — K21.9 GASTROESOPHAGEAL REFLUX DISEASE, UNSPECIFIED WHETHER ESOPHAGITIS PRESENT: ICD-10-CM

## 2024-01-30 DIAGNOSIS — Z12.11 SCREENING FOR COLON CANCER: ICD-10-CM

## 2024-01-30 DIAGNOSIS — E78.5 HYPERLIPIDEMIA LDL GOAL <100: Primary | ICD-10-CM

## 2024-01-30 PROCEDURE — 99214 OFFICE O/P EST MOD 30 MIN: CPT | Mod: 25 | Performed by: INTERNAL MEDICINE

## 2024-01-30 PROCEDURE — 90677 PCV20 VACCINE IM: CPT | Performed by: INTERNAL MEDICINE

## 2024-01-30 PROCEDURE — 90471 IMMUNIZATION ADMIN: CPT | Performed by: INTERNAL MEDICINE

## 2024-01-30 RX ORDER — FAMOTIDINE 20 MG/1
20 TABLET, FILM COATED ORAL 2 TIMES DAILY
Qty: 180 TABLET | Refills: 4 | Status: SHIPPED | OUTPATIENT
Start: 2024-01-30

## 2024-01-30 RX ORDER — MULTIVITAMIN
1 TABLET ORAL DAILY
Qty: 90 TABLET | Refills: 4 | Status: SHIPPED | OUTPATIENT
Start: 2024-01-30

## 2024-01-30 RX ORDER — ATORVASTATIN CALCIUM 40 MG/1
40 TABLET, FILM COATED ORAL DAILY
Qty: 90 TABLET | Refills: 4 | Status: SHIPPED | OUTPATIENT
Start: 2024-01-30

## 2024-01-30 ASSESSMENT — PATIENT HEALTH QUESTIONNAIRE - PHQ9
SUM OF ALL RESPONSES TO PHQ QUESTIONS 1-9: 0
SUM OF ALL RESPONSES TO PHQ QUESTIONS 1-9: 0
10. IF YOU CHECKED OFF ANY PROBLEMS, HOW DIFFICULT HAVE THESE PROBLEMS MADE IT FOR YOU TO DO YOUR WORK, TAKE CARE OF THINGS AT HOME, OR GET ALONG WITH OTHER PEOPLE: NOT DIFFICULT AT ALL

## 2024-01-30 NOTE — PROGRESS NOTES
Assessment & Plan   Hyperlipidemia LDL goal <100  Reviewed recent lipid panel results. Refilled chronic medication at current dose.  - atorvastatin (LIPITOR) 40 MG tablet; Take 1 tablet (40 mg) by mouth daily    Gastroesophageal reflux disease, unspecified whether esophagitis present  Refilled chronic medication at current dose.  - famotidine (PEPCID) 20 MG tablet; Take 1 tablet (20 mg) by mouth 2 times daily    Tobacco use disorder  Contemplative about quitting but declines any discussion about NRT/med options. Encouraged him to seriously consider quitting and to let me know if I can help in that journey.    Schizoaffective disorder, chronic condition (H)  Refilled chronic medication at current dose.  - multivitamin (ONE-DAILY) tablet; Take 1 tablet by mouth daily    Screening for colon cancer  Never screened before. Agreeable to Cologuard. Order placed.  - COLOGUARD(EXACT SCIENCES); Future    Encounter for immunization  Smoker  - PNEUMOCOCCAL 20 VALENT CONJUGATE (PREVNAR 20)    Minor Rivas MD, MPH  [facility]  Internal Medicine    Subjective   Zak is a 48 year old who presents today for a medication check. I last saw Zak over 2 years ago at which point he was started on atorvastatin for LDL of 200+. He had a lipid panel drawn per his mental health provider ~3 months ago with LDL now at goal. He reports he is tolerating medications well and would like refills.      Objective    /70   Pulse 61   Temp 97.8  F (36.6  C)   Wt 87.6 kg (193 lb 1.6 oz)   SpO2 99%   BMI 28.11 kg/m    Body mass index is 28.11 kg/m .    Physical Exam   GENERAL: alert and in no distress.  EYES: conjunctivae/corneas clear. EOMs grossly intact  HENT: Facies symmetric.  RESP: CTAB, no w/r/r. Lung sounds a bit distant.  CV: RRR, no m/r/g.  MSK: Moves all four extremities freely.  SKIN: No significant ulcers, lesions, or rashes on the visualized portions of the skin  NEURO: CN II-XII grossly  intact.

## 2024-01-30 NOTE — PATIENT INSTRUCTIONS
- Don't forget to return the stool kit mailed to you so we can screen you for colon cancer!    - Make an appointment with our pharmacy downstairs or stop by your preferred pharmacy to discuss obtaining the Shingrix (shingles) vaccine series

## 2024-02-13 ENCOUNTER — MEDICAL CORRESPONDENCE (OUTPATIENT)
Dept: HEALTH INFORMATION MANAGEMENT | Facility: CLINIC | Age: 49
End: 2024-02-13
Payer: COMMERCIAL

## 2024-03-14 ENCOUNTER — OFFICE VISIT (OUTPATIENT)
Dept: INTERNAL MEDICINE | Facility: CLINIC | Age: 49
End: 2024-03-14
Payer: COMMERCIAL

## 2024-03-14 VITALS
SYSTOLIC BLOOD PRESSURE: 110 MMHG | HEART RATE: 70 BPM | HEIGHT: 69 IN | DIASTOLIC BLOOD PRESSURE: 74 MMHG | WEIGHT: 195.2 LBS | OXYGEN SATURATION: 97 % | TEMPERATURE: 97.8 F | BODY MASS INDEX: 28.91 KG/M2

## 2024-03-14 DIAGNOSIS — R73.9 HYPERGLYCEMIA: ICD-10-CM

## 2024-03-14 DIAGNOSIS — E78.5 HYPERLIPIDEMIA LDL GOAL <100: ICD-10-CM

## 2024-03-14 DIAGNOSIS — F17.200 TOBACCO USE DISORDER: ICD-10-CM

## 2024-03-14 DIAGNOSIS — Z00.00 ROUTINE GENERAL MEDICAL EXAMINATION AT A HEALTH CARE FACILITY: Primary | ICD-10-CM

## 2024-03-14 LAB — HBA1C MFR BLD: 5.8 % (ref 0–5.6)

## 2024-03-14 PROCEDURE — 99396 PREV VISIT EST AGE 40-64: CPT | Performed by: INTERNAL MEDICINE

## 2024-03-14 PROCEDURE — 80048 BASIC METABOLIC PNL TOTAL CA: CPT | Performed by: INTERNAL MEDICINE

## 2024-03-14 PROCEDURE — 84460 ALANINE AMINO (ALT) (SGPT): CPT | Performed by: INTERNAL MEDICINE

## 2024-03-14 PROCEDURE — 80061 LIPID PANEL: CPT | Performed by: INTERNAL MEDICINE

## 2024-03-14 PROCEDURE — 36415 COLL VENOUS BLD VENIPUNCTURE: CPT | Performed by: INTERNAL MEDICINE

## 2024-03-14 PROCEDURE — 83036 HEMOGLOBIN GLYCOSYLATED A1C: CPT | Performed by: INTERNAL MEDICINE

## 2024-03-14 RX ORDER — HALOPERIDOL 2 MG/1
2 TABLET ORAL PRN
COMMUNITY

## 2024-03-14 SDOH — HEALTH STABILITY: PHYSICAL HEALTH: ON AVERAGE, HOW MANY DAYS PER WEEK DO YOU ENGAGE IN MODERATE TO STRENUOUS EXERCISE (LIKE A BRISK WALK)?: 0 DAYS

## 2024-03-14 SDOH — HEALTH STABILITY: PHYSICAL HEALTH: ON AVERAGE, HOW MANY MINUTES DO YOU ENGAGE IN EXERCISE AT THIS LEVEL?: 0 MIN

## 2024-03-14 ASSESSMENT — SOCIAL DETERMINANTS OF HEALTH (SDOH): HOW OFTEN DO YOU GET TOGETHER WITH FRIENDS OR RELATIVES?: ONCE A WEEK

## 2024-03-14 NOTE — LETTER
[facility]  [address] [address]  Phone: [phone]   3/18/2024          Zak Carpiohayliejessica  [address]  [place] [address]        Dear Mr. Zak Weeks:    I am writing to inform you of the results of the laboratory tests you had done recently. Your cholesterol is normal on atorvastatin. Continue that medication. Your electrolytes and liver look good. Your kidney function is mildly diminished from past results. Not to a dangerous level but definitely something we ought to recheck in the coming months at your next visit.     Your hemoglobin A1c is in the pre-diabetes range, indicating you have pre-diabetes. This means that while you do not have diabetes right now, you are at an elevated risk to develop it in the future. Weight loss can be very beneficial at this stage in order to help prevent the progression to diabetes and I encourage you to work on losing weight through dietary changes such as smaller portions, cutting out excessive sweets, etc.    If you have any further questions or problems, please contact our nurse line at [phone].           Sincerely,        Minor Rivas MD, MPH  Department of Internal Medicine  [facility]

## 2024-03-14 NOTE — PATIENT INSTRUCTIONS
- Make an appointment with our pharmacy downstairs or stop by your preferred pharmacy to discuss obtaining the Shingrix (shingles) vaccine series    - Don't forget to return the stool kit mailed to you so we can screen you for colon cancer!    - I will send you a message on MySmartPrice when I am able to look at the results of your tests from today

## 2024-03-14 NOTE — PROGRESS NOTES
"Preventive Care Visit  [facility]  Minor Rivas MD, Internal Medicine  Mar 14, 2024    Assessment & Plan   Routine general medical examination at a health care facility  Reviewed PMH. Discussed healthcare maintenance issues, including cancer screenings (he declines all today, telling me 'I'd rather not know and I'd rather just die if I have cancer'), relevant immunizations (encouraged patient obtain Shingrix through a pharmacy), and cardiac risk factor screenings such as for cholesterol, HTN, and DM.  - PRIMARY CARE FOLLOW-UP SCHEDULING; Future    Hyperlipidemia LDL goal <100  Lab check today. Continue atorvastatin.  - Lipid panel reflex to direct LDL Non-fasting; Future  - Basic metabolic panel; Future  - ALT; Future    Hyperglycemia  Seen on past metabolic panel. Will better characterize with A1c.  - Hemoglobin A1c; Future    Tobacco use disorder  Not interested in quitting. Advised him to think about it!    BMI  Estimated body mass index is 28.83 kg/m  as calculated from the following:    Height as of this encounter: 1.753 m (5' 9\").    Weight as of this encounter: 88.5 kg (195 lb 3.2 oz).     Counseling  Appropriate preventive services were discussed with this patient, including applicable screening as appropriate for fall prevention, nutrition, physical activity, Tobacco-use cessation, weight loss and cognition.  Checklist reviewing preventive services available has been given to the patient.  Reviewed patient's diet, addressing concerns and/or questions.   The patient was instructed to see the dentist every 6 months.     Signed Electronically by:  Minor Rivas MD, MPH  [facility]  Internal Medicine    Sharad Crespo is a 49 year old presenting for the following: Physical        3/14/2024   General Health   How would you rate your overall physical health? Good   Feel stress (tense, anxious, or unable to sleep) Not at all         3/14/2024   Nutrition "   Three or more servings of calcium each day? (!) I DON'T KNOW   Diet: Regular (no restrictions)   How many servings of fruit and vegetables per day? (!) 0-1   How many sweetened beverages each day? (!) 2         3/14/2024   Exercise   Days per week of moderate/strenous exercise 0 days   Average minutes spent exercising at this level 0 min   (!) EXERCISE CONCERN      3/14/2024   Social Factors   Frequency of gathering with friends or relatives Once a week   Worry food won't last until get money to buy more No   Food not last or not have enough money for food? No   Do you have housing?  Yes   Are you worried about losing your housing? No   Lack of transportation? No   Unable to get utilities (heat,electricity)? No         3/14/2024   Dental   Dentist two times every year? (!) NO     Today's PHQ-9 Score:       1/30/2024     7:53 AM   PHQ-9 SCORE   PHQ-9 Total Score MyChart 0   PHQ-9 Total Score 0         3/14/2024   Substance Use   If I could quit smoking, I would Somewhat agree   I want to quit somking, worry about health affects Somewhat agree   Willing to make a plan to quit smoking Completely disagree   Willing to cut down before quitting Somewhat disagree   Alcohol more than 3/day or more than 7/wk No   Do you use any other substances recreationally? (!) CANNABIS PRODUCTS     Social History     Tobacco Use    Smoking status: Every Day     Types: Cigarettes    Smokeless tobacco: Never   Substance Use Topics    Alcohol use: Yes     Comment: once a week    Drug use: No     Comment: meth addict, quit in 2005         3/14/2024   STI Screening   New sexual partner(s) since last STI/HIV test? No   ASCVD Risk   The 10-year ASCVD risk score (Colin DUMAS, et al., 2019) is: 4.4%    Values used to calculate the score:      Age: 49 years      Sex: Male      Is Non- : No      Diabetic: No      Tobacco smoker: Yes      Systolic Blood Pressure: 110 mmHg      Is BP treated: No      HDL Cholesterol:  "41 mg/dL      Total Cholesterol: 140 mg/dL        3/14/2024   Contraception/Family Planning   Questions about contraception or family planning No      Reviewed and updated as needed this visit by Provider   Tobacco  Allergies  Meds  Problems  Med Hx  Surg Hx  Fam Hx             Objective    Exam  /74   Pulse 70   Temp 97.8  F (36.6  C)   Ht 1.753 m (5' 9\")   Wt 88.5 kg (195 lb 3.2 oz)   SpO2 97%   BMI 28.83 kg/m     Estimated body mass index is 28.83 kg/m  as calculated from the following:    Height as of this encounter: 1.753 m (5' 9\").    Weight as of this encounter: 88.5 kg (195 lb 3.2 oz).    Physical Exam  GENERAL: In no distress.  EYES: Conjunctivae/corneas clear. EOMs grossly intact.  HENT: NC/AT, facies symmetric.  RESP: CTAB. No w/r/r.  CV: RRR, no m/r/g.  GI: NT, ND, without rebound or guarding, no CVA tenderness  MSK: Moves all four extremities freely.  SKIN: No significant ulcers, lesions or rashes on the visualized portions of the skin  NEURO: Alert. Oriented.  PSYCH: Linear thought process. Speech normal rate and volume. No tangential thoughts, hallucinations, or delusions.  "

## 2024-03-14 NOTE — COMMUNITY RESOURCES LIST (ENGLISH)
March 14, 2024           YOUR PERSONALIZED LIST OF SERVICES & PROGRAMS           & RECREATION    Sports      of the [place] - Sports clubs and recreational activities - [employer] [facility]  [address] [place] [address] (Distance: 4.9 miles)  Language: English  Fee: Self pay, Sliding scale      of the [place] - Sports clubs and recreational activities - [employer] [facility]  [address] [place] [address] (Distance: 3.7 miles)  Language: English  Fee: Self pay, Sliding scale      [facility] - Adult Enrichment  Phone: [phone]  Website: https://Queralt/adults-seniors/adult-enrichment/  Language: English  Hours: Mon 7:30 AM - 4:00 PM Tue 7:30 AM - 4:00 PM Wed 7:30 AM - 4:00 PM Thu 7:30 AM - 4:00 PM Fri 7:30 AM - 4:00 PM    Classes/Groups      Park & Recreation Board - Gym or workout facility - [employer] [facility]  [address] [place] [address] (Distance: 8.6 miles)  Language: English  Fee: Free, Self pay  Accessibility: Ada accessible      Park & Recreation Board - Gym or workout facility - [employer] [facility]  [address] [place] [address] (Distance: 6.7 miles)  Phone: [phone]  Language: English, Croatian  Fee: Free, Self pay  Accessibility: Translation services      [employer] - [facility]  Phone: [phone]  Email: contact@[email or web address]  Website: [email or web address] Analysis  Language: English  Hours: Mon 9:00 AM - 6:00 PM Tue 9:00 AM - 6:00 PM Wed 9:00 AM - 6:00 PM Thu 9:00 AM - 6:00 PM Fri 9:00 AM - 6:00 PM  Fee: Free               IMPORTANT NUMBERS & WEBSITES        Emergency Services  911  .   United Way  211 http://211unitedway.org  .   Poison Control  (962) 605-3336 http://[place]poison.org http://wisconsinpoison.org  .     Suicide and Crisis Lifeline  988 http://988[facility] Randolph Medical Centerline.org  .   Childhelp  National Child Abuse Hotline  602.392.4744 http://Childhelphotline.org   .   National Sexual Assault Hotline  (555) 660-7517 (HOPE) http://Rainn.org   .     National Runaway Safeline  (592) 667-7105 (RUNAWAY) http://RoboCent.Hangar Seven  .   Pregnancy & Postpartum Support  Call/text 229-970-6055  [place]: http://ppsupportmn.org  [place]: http://psichapters.com/wi  .   Substance Abuse National Helpline ([facility])  853-803-HELP (5754) http://Findtreatment.gov   .                DISCLAIMER: [employer] does not endorse any service providers mentioned in this resource list. [employer] does not guarantee that the services mentioned in this resource list will be available to you or will improve your health or wellness.    [facility]

## 2024-03-15 LAB
ALT SERPL W P-5'-P-CCNC: 10 U/L (ref 0–70)
ANION GAP SERPL CALCULATED.3IONS-SCNC: 10 MMOL/L (ref 7–15)
BUN SERPL-MCNC: 12.2 MG/DL (ref 6–20)
CALCIUM SERPL-MCNC: 9.5 MG/DL (ref 8.6–10)
CHLORIDE SERPL-SCNC: 104 MMOL/L (ref 98–107)
CHOLEST SERPL-MCNC: 156 MG/DL
CREAT SERPL-MCNC: 1.33 MG/DL (ref 0.67–1.17)
DEPRECATED HCO3 PLAS-SCNC: 27 MMOL/L (ref 22–29)
EGFRCR SERPLBLD CKD-EPI 2021: 65 ML/MIN/1.73M2
FASTING STATUS PATIENT QL REPORTED: NO
GLUCOSE SERPL-MCNC: 94 MG/DL (ref 70–99)
HDLC SERPL-MCNC: 42 MG/DL
LDLC SERPL CALC-MCNC: 90 MG/DL
NONHDLC SERPL-MCNC: 114 MG/DL
POTASSIUM SERPL-SCNC: 4.4 MMOL/L (ref 3.4–5.3)
SODIUM SERPL-SCNC: 141 MMOL/L (ref 135–145)
TRIGL SERPL-MCNC: 118 MG/DL

## 2024-07-16 ENCOUNTER — MEDICAL CORRESPONDENCE (OUTPATIENT)
Dept: HEALTH INFORMATION MANAGEMENT | Facility: CLINIC | Age: 49
End: 2024-07-16
Payer: COMMERCIAL

## 2024-09-03 ENCOUNTER — MEDICAL CORRESPONDENCE (OUTPATIENT)
Dept: HEALTH INFORMATION MANAGEMENT | Facility: CLINIC | Age: 49
End: 2024-09-03
Payer: COMMERCIAL